# Patient Record
Sex: MALE | Race: WHITE | Employment: FULL TIME | URBAN - METROPOLITAN AREA
[De-identification: names, ages, dates, MRNs, and addresses within clinical notes are randomized per-mention and may not be internally consistent; named-entity substitution may affect disease eponyms.]

---

## 2021-12-22 ENCOUNTER — APPOINTMENT (OUTPATIENT)
Dept: GENERAL RADIOLOGY | Age: 26
DRG: 420 | End: 2021-12-22
Payer: MEDICAID

## 2021-12-22 ENCOUNTER — HOSPITAL ENCOUNTER (INPATIENT)
Age: 26
LOS: 1 days | Discharge: HOME | DRG: 420 | End: 2021-12-23
Attending: EMERGENCY MEDICINE | Admitting: INTERNAL MEDICINE
Payer: MEDICAID

## 2021-12-22 DIAGNOSIS — N39.0 URINARY TRACT INFECTION WITHOUT HEMATURIA, SITE UNSPECIFIED: Primary | ICD-10-CM

## 2021-12-22 DIAGNOSIS — R11.2 NON-INTRACTABLE VOMITING WITH NAUSEA, UNSPECIFIED VOMITING TYPE: ICD-10-CM

## 2021-12-22 DIAGNOSIS — E86.0 DEHYDRATION: ICD-10-CM

## 2021-12-22 PROBLEM — E10.10 DKA, TYPE 1, NOT AT GOAL (HCC): Status: ACTIVE | Noted: 2021-12-22

## 2021-12-22 LAB
A/G RATIO: 1.1 (ref 1.1–2.2)
ALBUMIN SERPL-MCNC: 4.5 G/DL (ref 3.4–5)
ALP BLD-CCNC: 115 U/L (ref 40–129)
ALT SERPL-CCNC: 17 U/L (ref 10–40)
ANION GAP SERPL CALCULATED.3IONS-SCNC: 11 MMOL/L (ref 3–16)
ANION GAP SERPL CALCULATED.3IONS-SCNC: 21 MMOL/L (ref 3–16)
AST SERPL-CCNC: 19 U/L (ref 15–37)
BACTERIA: ABNORMAL /HPF
BASE EXCESS VENOUS: -3.3 MMOL/L (ref -3–3)
BASOPHILS ABSOLUTE: 0 K/UL (ref 0–0.2)
BASOPHILS ABSOLUTE: 0.1 K/UL (ref 0–0.2)
BASOPHILS RELATIVE PERCENT: 0.3 %
BASOPHILS RELATIVE PERCENT: 0.4 %
BETA-HYDROXYBUTYRATE: 4.07 MMOL/L (ref 0–0.27)
BILIRUB SERPL-MCNC: 0.7 MG/DL (ref 0–1)
BILIRUBIN URINE: ABNORMAL
BLOOD, URINE: ABNORMAL
BUN BLDV-MCNC: 21 MG/DL (ref 7–20)
BUN BLDV-MCNC: 27 MG/DL (ref 7–20)
CALCIUM SERPL-MCNC: 10.5 MG/DL (ref 8.3–10.6)
CALCIUM SERPL-MCNC: 9.1 MG/DL (ref 8.3–10.6)
CARBOXYHEMOGLOBIN: 2.9 % (ref 0–1.5)
CHLORIDE BLD-SCNC: 101 MMOL/L (ref 99–110)
CHLORIDE BLD-SCNC: 95 MMOL/L (ref 99–110)
CLARITY: CLEAR
CO2: 18 MMOL/L (ref 21–32)
CO2: 23 MMOL/L (ref 21–32)
COLOR: YELLOW
CREAT SERPL-MCNC: 0.8 MG/DL (ref 0.9–1.3)
CREAT SERPL-MCNC: 1 MG/DL (ref 0.9–1.3)
EKG ATRIAL RATE: 97 BPM
EKG DIAGNOSIS: NORMAL
EKG P AXIS: 68 DEGREES
EKG P-R INTERVAL: 132 MS
EKG Q-T INTERVAL: 364 MS
EKG QRS DURATION: 100 MS
EKG QTC CALCULATION (BAZETT): 462 MS
EKG R AXIS: -9 DEGREES
EKG T AXIS: 71 DEGREES
EKG VENTRICULAR RATE: 97 BPM
EOSINOPHILS ABSOLUTE: 0 K/UL (ref 0–0.6)
EOSINOPHILS ABSOLUTE: 0 K/UL (ref 0–0.6)
EOSINOPHILS RELATIVE PERCENT: 0 %
EOSINOPHILS RELATIVE PERCENT: 0.2 %
EPITHELIAL CELLS, UA: ABNORMAL /HPF (ref 0–5)
ETHANOL: NORMAL MG/DL (ref 0–0.08)
GFR AFRICAN AMERICAN: >60
GFR AFRICAN AMERICAN: >60
GFR NON-AFRICAN AMERICAN: >60
GFR NON-AFRICAN AMERICAN: >60
GLUCOSE BLD-MCNC: 129 MG/DL (ref 70–99)
GLUCOSE BLD-MCNC: 137 MG/DL (ref 70–99)
GLUCOSE BLD-MCNC: 141 MG/DL (ref 70–99)
GLUCOSE BLD-MCNC: 145 MG/DL (ref 70–99)
GLUCOSE BLD-MCNC: 178 MG/DL (ref 70–99)
GLUCOSE BLD-MCNC: 183 MG/DL (ref 70–99)
GLUCOSE BLD-MCNC: 207 MG/DL (ref 70–99)
GLUCOSE BLD-MCNC: 214 MG/DL (ref 70–99)
GLUCOSE BLD-MCNC: 217 MG/DL (ref 70–99)
GLUCOSE URINE: >=1000 MG/DL
HCO3 VENOUS: 21.1 MMOL/L (ref 23–29)
HCT VFR BLD CALC: 40.7 % (ref 40.5–52.5)
HCT VFR BLD CALC: 48.6 % (ref 40.5–52.5)
HEMOGLOBIN: 14.1 G/DL (ref 13.5–17.5)
HEMOGLOBIN: 16.8 G/DL (ref 13.5–17.5)
HYALINE CASTS: ABNORMAL /LPF (ref 0–2)
KETONES, URINE: >=80 MG/DL
LACTIC ACID: 1 MMOL/L (ref 0.4–2)
LEUKOCYTE ESTERASE, URINE: NEGATIVE
LIPASE: 8 U/L (ref 13–60)
LYMPHOCYTES ABSOLUTE: 1.4 K/UL (ref 1–5.1)
LYMPHOCYTES ABSOLUTE: 2.2 K/UL (ref 1–5.1)
LYMPHOCYTES RELATIVE PERCENT: 10.3 %
LYMPHOCYTES RELATIVE PERCENT: 14.3 %
MAGNESIUM: 1.4 MG/DL (ref 1.8–2.4)
MAGNESIUM: 1.4 MG/DL (ref 1.8–2.4)
MCH RBC QN AUTO: 30.2 PG (ref 26–34)
MCH RBC QN AUTO: 30.2 PG (ref 26–34)
MCHC RBC AUTO-ENTMCNC: 34.6 G/DL (ref 31–36)
MCHC RBC AUTO-ENTMCNC: 34.6 G/DL (ref 31–36)
MCV RBC AUTO: 87.1 FL (ref 80–100)
MCV RBC AUTO: 87.3 FL (ref 80–100)
METHEMOGLOBIN VENOUS: 0.4 %
MICROSCOPIC EXAMINATION: YES
MONOCYTES ABSOLUTE: 0.7 K/UL (ref 0–1.3)
MONOCYTES ABSOLUTE: 1.1 K/UL (ref 0–1.3)
MONOCYTES RELATIVE PERCENT: 5.1 %
MONOCYTES RELATIVE PERCENT: 7 %
MUCUS: ABNORMAL /LPF
NEUTROPHILS ABSOLUTE: 11.7 K/UL (ref 1.7–7.7)
NEUTROPHILS ABSOLUTE: 11.8 K/UL (ref 1.7–7.7)
NEUTROPHILS RELATIVE PERCENT: 78.1 %
NEUTROPHILS RELATIVE PERCENT: 84.3 %
NITRITE, URINE: NEGATIVE
O2 SAT, VEN: 86 %
O2 THERAPY: ABNORMAL
PCO2, VEN: 36.5 MMHG (ref 40–50)
PDW BLD-RTO: 12.4 % (ref 12.4–15.4)
PDW BLD-RTO: 12.5 % (ref 12.4–15.4)
PERFORMED ON: ABNORMAL
PH UA: 5.5 (ref 5–8)
PH VENOUS: 7.38 (ref 7.35–7.45)
PHOSPHORUS: 2.9 MG/DL (ref 2.5–4.9)
PLATELET # BLD: 227 K/UL (ref 135–450)
PLATELET # BLD: 253 K/UL (ref 135–450)
PMV BLD AUTO: 8.9 FL (ref 5–10.5)
PMV BLD AUTO: 9.3 FL (ref 5–10.5)
PO2, VEN: 49.2 MMHG (ref 25–40)
POTASSIUM REFLEX MAGNESIUM: 4.2 MMOL/L (ref 3.5–5.1)
POTASSIUM SERPL-SCNC: 4.4 MMOL/L (ref 3.5–5.1)
PROCALCITONIN: 0.06 NG/ML (ref 0–0.15)
PROTEIN UA: ABNORMAL MG/DL
RAPID INFLUENZA  B AGN: NEGATIVE
RAPID INFLUENZA A AGN: NEGATIVE
RBC # BLD: 4.68 M/UL (ref 4.2–5.9)
RBC # BLD: 5.57 M/UL (ref 4.2–5.9)
RBC UA: ABNORMAL /HPF (ref 0–4)
SARS-COV-2, NAAT: NOT DETECTED
SODIUM BLD-SCNC: 134 MMOL/L (ref 136–145)
SODIUM BLD-SCNC: 135 MMOL/L (ref 136–145)
SPECIFIC GRAVITY UA: >=1.03 (ref 1–1.03)
TCO2 CALC VENOUS: 22 MMOL/L
TOTAL CK: 109 U/L (ref 39–308)
TOTAL PROTEIN: 8.5 G/DL (ref 6.4–8.2)
TROPONIN: <0.01 NG/ML
URINE REFLEX TO CULTURE: YES
URINE TYPE: ABNORMAL
UROBILINOGEN, URINE: 0.2 E.U./DL
WBC # BLD: 13.9 K/UL (ref 4–11)
WBC # BLD: 15.1 K/UL (ref 4–11)
WBC UA: ABNORMAL /HPF (ref 0–5)
YEAST: PRESENT /HPF

## 2021-12-22 PROCEDURE — 84100 ASSAY OF PHOSPHORUS: CPT

## 2021-12-22 PROCEDURE — 83690 ASSAY OF LIPASE: CPT

## 2021-12-22 PROCEDURE — 96376 TX/PRO/DX INJ SAME DRUG ADON: CPT

## 2021-12-22 PROCEDURE — 84484 ASSAY OF TROPONIN QUANT: CPT

## 2021-12-22 PROCEDURE — 6370000000 HC RX 637 (ALT 250 FOR IP): Performed by: INTERNAL MEDICINE

## 2021-12-22 PROCEDURE — G0378 HOSPITAL OBSERVATION PER HR: HCPCS

## 2021-12-22 PROCEDURE — 87040 BLOOD CULTURE FOR BACTERIA: CPT

## 2021-12-22 PROCEDURE — 87086 URINE CULTURE/COLONY COUNT: CPT

## 2021-12-22 PROCEDURE — 73130 X-RAY EXAM OF HAND: CPT

## 2021-12-22 PROCEDURE — 2580000003 HC RX 258: Performed by: EMERGENCY MEDICINE

## 2021-12-22 PROCEDURE — 83605 ASSAY OF LACTIC ACID: CPT

## 2021-12-22 PROCEDURE — 36415 COLL VENOUS BLD VENIPUNCTURE: CPT

## 2021-12-22 PROCEDURE — 1200000000 HC SEMI PRIVATE

## 2021-12-22 PROCEDURE — 82077 ASSAY SPEC XCP UR&BREATH IA: CPT

## 2021-12-22 PROCEDURE — 82550 ASSAY OF CK (CPK): CPT

## 2021-12-22 PROCEDURE — 80053 COMPREHEN METABOLIC PANEL: CPT

## 2021-12-22 PROCEDURE — 96368 THER/DIAG CONCURRENT INF: CPT

## 2021-12-22 PROCEDURE — 6360000002 HC RX W HCPCS: Performed by: INTERNAL MEDICINE

## 2021-12-22 PROCEDURE — 83735 ASSAY OF MAGNESIUM: CPT

## 2021-12-22 PROCEDURE — 96365 THER/PROPH/DIAG IV INF INIT: CPT

## 2021-12-22 PROCEDURE — 93010 ELECTROCARDIOGRAM REPORT: CPT | Performed by: INTERNAL MEDICINE

## 2021-12-22 PROCEDURE — 99284 EMERGENCY DEPT VISIT MOD MDM: CPT

## 2021-12-22 PROCEDURE — 73090 X-RAY EXAM OF FOREARM: CPT

## 2021-12-22 PROCEDURE — 6360000002 HC RX W HCPCS

## 2021-12-22 PROCEDURE — 82010 KETONE BODYS QUAN: CPT

## 2021-12-22 PROCEDURE — 96375 TX/PRO/DX INJ NEW DRUG ADDON: CPT

## 2021-12-22 PROCEDURE — 84145 PROCALCITONIN (PCT): CPT

## 2021-12-22 PROCEDURE — C9113 INJ PANTOPRAZOLE SODIUM, VIA: HCPCS | Performed by: EMERGENCY MEDICINE

## 2021-12-22 PROCEDURE — 71046 X-RAY EXAM CHEST 2 VIEWS: CPT

## 2021-12-22 PROCEDURE — 96366 THER/PROPH/DIAG IV INF ADDON: CPT

## 2021-12-22 PROCEDURE — 96361 HYDRATE IV INFUSION ADD-ON: CPT

## 2021-12-22 PROCEDURE — 87804 INFLUENZA ASSAY W/OPTIC: CPT

## 2021-12-22 PROCEDURE — 93005 ELECTROCARDIOGRAM TRACING: CPT | Performed by: EMERGENCY MEDICINE

## 2021-12-22 PROCEDURE — 6360000002 HC RX W HCPCS: Performed by: EMERGENCY MEDICINE

## 2021-12-22 PROCEDURE — 82803 BLOOD GASES ANY COMBINATION: CPT

## 2021-12-22 PROCEDURE — 85025 COMPLETE CBC W/AUTO DIFF WBC: CPT

## 2021-12-22 PROCEDURE — 81001 URINALYSIS AUTO W/SCOPE: CPT

## 2021-12-22 PROCEDURE — 87635 SARS-COV-2 COVID-19 AMP PRB: CPT

## 2021-12-22 RX ORDER — DEXTROSE MONOHYDRATE 25 G/50ML
12.5 INJECTION, SOLUTION INTRAVENOUS PRN
Status: DISCONTINUED | OUTPATIENT
Start: 2021-12-22 | End: 2021-12-23 | Stop reason: HOSPADM

## 2021-12-22 RX ORDER — 0.9 % SODIUM CHLORIDE 0.9 %
30 INTRAVENOUS SOLUTION INTRAVENOUS ONCE
Status: COMPLETED | OUTPATIENT
Start: 2021-12-22 | End: 2021-12-22

## 2021-12-22 RX ORDER — ONDANSETRON 2 MG/ML
4 INJECTION INTRAMUSCULAR; INTRAVENOUS ONCE
Status: COMPLETED | OUTPATIENT
Start: 2021-12-22 | End: 2021-12-22

## 2021-12-22 RX ORDER — ONDANSETRON 2 MG/ML
INJECTION INTRAMUSCULAR; INTRAVENOUS
Status: COMPLETED
Start: 2021-12-22 | End: 2021-12-22

## 2021-12-22 RX ORDER — 0.9 % SODIUM CHLORIDE 0.9 %
1000 INTRAVENOUS SOLUTION INTRAVENOUS ONCE
Status: COMPLETED | OUTPATIENT
Start: 2021-12-22 | End: 2021-12-22

## 2021-12-22 RX ORDER — DEXTROSE MONOHYDRATE 50 MG/ML
100 INJECTION, SOLUTION INTRAVENOUS PRN
Status: DISCONTINUED | OUTPATIENT
Start: 2021-12-22 | End: 2021-12-23 | Stop reason: HOSPADM

## 2021-12-22 RX ORDER — SODIUM CHLORIDE 9 MG/ML
INJECTION, SOLUTION INTRAVENOUS CONTINUOUS
Status: DISCONTINUED | OUTPATIENT
Start: 2021-12-22 | End: 2021-12-23 | Stop reason: HOSPADM

## 2021-12-22 RX ORDER — INSULIN GLARGINE 100 [IU]/ML
20 INJECTION, SOLUTION SUBCUTANEOUS NIGHTLY
Status: DISCONTINUED | OUTPATIENT
Start: 2021-12-22 | End: 2021-12-23 | Stop reason: HOSPADM

## 2021-12-22 RX ORDER — NICOTINE POLACRILEX 4 MG
15 LOZENGE BUCCAL PRN
Status: DISCONTINUED | OUTPATIENT
Start: 2021-12-22 | End: 2021-12-23 | Stop reason: HOSPADM

## 2021-12-22 RX ORDER — ACETAMINOPHEN 650 MG/1
650 SUPPOSITORY RECTAL EVERY 6 HOURS PRN
Status: DISCONTINUED | OUTPATIENT
Start: 2021-12-22 | End: 2021-12-23 | Stop reason: HOSPADM

## 2021-12-22 RX ORDER — DEXTROSE AND SODIUM CHLORIDE 5; .45 G/100ML; G/100ML
INJECTION, SOLUTION INTRAVENOUS CONTINUOUS PRN
Status: DISCONTINUED | OUTPATIENT
Start: 2021-12-22 | End: 2021-12-23 | Stop reason: HOSPADM

## 2021-12-22 RX ORDER — POTASSIUM CHLORIDE 7.45 MG/ML
10 INJECTION INTRAVENOUS PRN
Status: DISCONTINUED | OUTPATIENT
Start: 2021-12-22 | End: 2021-12-23 | Stop reason: HOSPADM

## 2021-12-22 RX ORDER — INSULIN GLARGINE 100 [IU]/ML
INJECTION, SOLUTION SUBCUTANEOUS DAILY
COMMUNITY

## 2021-12-22 RX ORDER — ONDANSETRON 2 MG/ML
4 INJECTION INTRAMUSCULAR; INTRAVENOUS EVERY 6 HOURS PRN
Status: DISCONTINUED | OUTPATIENT
Start: 2021-12-22 | End: 2021-12-23 | Stop reason: HOSPADM

## 2021-12-22 RX ORDER — ACETAMINOPHEN 325 MG/1
650 TABLET ORAL EVERY 6 HOURS PRN
Status: DISCONTINUED | OUTPATIENT
Start: 2021-12-22 | End: 2021-12-23 | Stop reason: HOSPADM

## 2021-12-22 RX ORDER — POLYETHYLENE GLYCOL 3350 17 G/17G
17 POWDER, FOR SOLUTION ORAL DAILY PRN
Status: DISCONTINUED | OUTPATIENT
Start: 2021-12-22 | End: 2021-12-23 | Stop reason: HOSPADM

## 2021-12-22 RX ORDER — MAGNESIUM SULFATE 1 G/100ML
1000 INJECTION INTRAVENOUS PRN
Status: DISCONTINUED | OUTPATIENT
Start: 2021-12-22 | End: 2021-12-23 | Stop reason: HOSPADM

## 2021-12-22 RX ADMIN — ONDANSETRON 4 MG: 2 INJECTION INTRAMUSCULAR; INTRAVENOUS at 13:57

## 2021-12-22 RX ADMIN — SODIUM CHLORIDE 1000 ML: 9 INJECTION, SOLUTION INTRAVENOUS at 03:21

## 2021-12-22 RX ADMIN — CEFTRIAXONE SODIUM 1000 MG: 1 INJECTION, POWDER, FOR SOLUTION INTRAMUSCULAR; INTRAVENOUS at 06:47

## 2021-12-22 RX ADMIN — SODIUM CHLORIDE 80 MG: 9 INJECTION, SOLUTION INTRAVENOUS at 04:08

## 2021-12-22 RX ADMIN — SALINE NASAL SPRAY 1 SPRAY: 1.5 SOLUTION NASAL at 13:57

## 2021-12-22 RX ADMIN — INSULIN GLARGINE 20 UNITS: 100 INJECTION, SOLUTION SUBCUTANEOUS at 21:30

## 2021-12-22 RX ADMIN — SODIUM CHLORIDE 2382 ML: 9 INJECTION, SOLUTION INTRAVENOUS at 06:43

## 2021-12-22 RX ADMIN — INSULIN HUMAN 17 UNITS: 100 INJECTION, SUSPENSION SUBCUTANEOUS at 06:52

## 2021-12-22 RX ADMIN — ONDANSETRON 4 MG: 2 INJECTION INTRAMUSCULAR; INTRAVENOUS at 18:38

## 2021-12-22 RX ADMIN — SODIUM CHLORIDE 8 MG/HR: 9 INJECTION, SOLUTION INTRAVENOUS at 04:37

## 2021-12-22 RX ADMIN — ONDANSETRON 4 MG: 2 INJECTION INTRAMUSCULAR; INTRAVENOUS at 03:21

## 2021-12-22 ASSESSMENT — PAIN SCALES - GENERAL
PAINLEVEL_OUTOF10: 0
PAINLEVEL_OUTOF10: 8
PAINLEVEL_OUTOF10: 3
PAINLEVEL_OUTOF10: 0

## 2021-12-22 ASSESSMENT — PAIN DESCRIPTION - PAIN TYPE: TYPE: ACUTE PAIN

## 2021-12-22 ASSESSMENT — PAIN DESCRIPTION - DESCRIPTORS: DESCRIPTORS: HEADACHE

## 2021-12-22 ASSESSMENT — PAIN DESCRIPTION - LOCATION: LOCATION: HEAD

## 2021-12-22 NOTE — ED PROVIDER NOTES
[facility]  ED  EMERGENCY DEPARTMENT ENCOUNTER      Pt Name: Tita Tolbert  MRN: [id number]  [place] 1995  Date of evaluation: 12/22/2021  Provider: Dougie Dale MD    [address] [place] [address]       Chief Complaint   Patient presents with    Nausea     pt to ED with c/o N/V for a few days at home. type 1 diabetic.  for EMS. HISTORY OF PRESENT ILLNESS   (Location/Symptom, Timing/Onset,Context/Setting, Quality, Duration, Modifying Factors, Severity)  Note limiting factors. Tita Tolbert is a 23 y.o. male who presents to the emergency department for feels like he is dying. He is type 1 diabetic and has been having nausea and vomiting. Generalized weakness. Chest pain. Blood in his vomit. Patient is very uncomfortable and is having a hard time providing with history. Nursing notes were reviewed. REVIEW OF SYSTEMS    (2-9 systems for level 4, 10 or more for level 5)     Review of Systems  Difficult to obtain due to poor cooperation    PAST MEDICAL HISTORY     Past Medical History:   Diagnosis Date    Diabetes mellitus ([address]     Thyroid disease          SURGICALHISTORY     History reviewed. No pertinent surgical history. CURRENT MEDICATIONS       Previous Medications    No medications on file       ALLERGIES     Sulfa antibiotics    FAMILY HISTORY     History reviewed. No pertinent family history.        SOCIAL HISTORY       Social History     Socioeconomic History    Marital status: Single     Spouse name: None    Number of children: None    Years of education: None    Highest education level: None   Occupational History    None   Tobacco Use    Smoking status: Never Smoker    Smokeless tobacco: Never Used   Substance and Sexual Activity    Alcohol use: Not Currently    Drug use: Yes     Types: Marijuana Maurita Handsome)    Sexual activity: None   Other Topics Concern    None   Social History Narrative    None     Social Determinants of Health     Financial Resource Strain:  Difficulty of Paying Living Expenses: Not on file   Food Insecurity:     Worried About Running Out of Food in the Last Year: Not on file    Ran Out of Food in the Last Year: Not on file   Transportation Needs:     Lack of Transportation (Medical): Not on file    Lack of Transportation (Non-Medical): Not on file   Physical Activity:     Days of Exercise per Week: Not on file    Minutes of Exercise per Session: Not on file   Stress:     Feeling of Stress : Not on file   Social Connections:     Frequency of Communication with Friends and Family: Not on file    Frequency of Social Gatherings with Friends and Family: Not on file    Attends Episcopal Services: Not on file    Active Member of [email or web address] Group or Organizations: Not on file    Attends Club or Organization Meetings: Not on file    Marital Status: Not on file   Intimate Partner Violence:     Fear of Current or Ex-Partner: Not on file    Emotionally Abused: Not on file    Physically Abused: Not on file    Sexually Abused: Not on file   Housing Stability:     Unable to Pay for Housing in the Last Year: Not on file    Number of [place] in the Last Year: Not on file    Unstable Housing in the Last Year: Not on file       SCREENINGS             PHYSICAL EXAM    (up to 7 for level 4, 8 or more for level 5)     ED Triage Vitals [12/22/21 0300]   BP Temp Temp Source Pulse Resp SpO2 Height Weight   (!) 132/95 97.6 °F (36.4 °C) Oral 96 16 97 % 6' 1\" (1.854 m) 175 lb (79.4 kg)       Physical Exam  Vitals and nursing note reviewed. Constitutional:       Appearance: Normal appearance. He is well-developed. He is ill-appearing. HENT:      Head: Normocephalic and atraumatic. Right Ear: External ear normal.      Left Ear: External ear normal.      Nose: Nose normal.      Mouth/Throat:      Comments: Edentulous  Eyes:      General: No scleral icterus. Right eye: No discharge. Left eye: No discharge.       Conjunctiva/sclera: TO MG FOR LOW K - Abnormal; Notable for the following components:    Sodium 134 (*)     Chloride 95 (*)     CO2 18 (*)     Anion Gap 21 (*)     Glucose 207 (*)     BUN 27 (*)     Total Protein 8.5 (*)     All other components within normal limits    Narrative:     Performed at:  [address], [facility] [employer]   Phone [phone]   BLOOD GAS, VENOUS - Abnormal; Notable for the following components:    pCO2, Vladimir 36.5 (*)     pO2, Vladimir 49.2 (*)     HCO3, Venous 21.1 (*)     Base Excess, Vladimir -3.3 (*)     Carboxyhemoglobin 2.9 (*)     All other components within normal limits    Narrative:     Performed at:  [address], [facility] [employer]   Phone [phone]   URINE RT REFLEX TO CULTURE - Abnormal; Notable for the following components:    Glucose, Ur >=1000 (*)     Bilirubin Urine SMALL (*)     Ketones, Urine >=80 (*)     Blood, Urine TRACE-INTACT (*)     Protein, UA TRACE (*)     All other components within normal limits    Narrative:     Performed at:  [address], [facility] [employer]   Phone [phone]   LIPASE - Abnormal; Notable for the following components:    Lipase 8.0 (*)     All other components within normal limits    Narrative:     Performed at:  [address], [facility] [employer]   Phone [phone]   MICROSCOPIC URINALYSIS - Abnormal; Notable for the following components:    Mucus, UA 1+ (*)     WBC, UA 10-20 (*)     Bacteria, UA Rare (*)     Yeast, UA Present (*)     All other components within normal limits    Narrative:     Performed at:  [address], [facility] [employer]   Phone [phone]   POCT GLUCOSE - Abnormal; Notable for the following components:    POC Glucose 183 (*)     All other components within normal limits    Narrative: to the hospitalist on duty who has agreed to admit this patient to his service. SEP-1 CORE MEASURE DATA      Sepsis Criteria   Severe Sepsis Criteria   Septic Shock Criteria     Must be confirmed or suspected to move forward with diagnosis of sepsis. Must meet 2:    [] Temperature > 100.9 F (38.3 C)        or < 96.8 F (36 C)  [x] HR > 90  [] RR > 20  [x] WBC > 12 or < 4 or 10% bands    AND:    [x] Infection Confirmed or        Suspected. OR:    [] Exclude from SEP-1 because:    [] No infection present or suspected   [] May have sepsis, but does not meet criteria for severe sepsis or septic shock  [] Alternative explanation for abnormal labs and/or vitals (see MDM)  [] Viral etiology found or highly suspected (including COVID-19) without concomitant bacterial infection   Must meet 1:    [] Lactate > 2       or   [] Signs of Organ Dysfunction:    - SBP < 90 or MAP < 65  - Altered mental status  - Creatinine > 2 or increased from      baseline  - Urine Output < 0.5 ml/kg/hr  - Bilirubin > 2  - INR > 1.5 (not anticoagulated)  - Platelets < 311,105  - Acute Respiratory Failure as     evidenced by new need for NIPPV     or mechanical ventilation        [] No criteria met for Severe Sepsis. Must meet 1:    [] Lactate > 4        or   [] SBP < 90 or MAP < 65 for at        least two readings in the first        hour after fluid bolus        administration      [] Vasopressors initiated (if hypotension persists after fluid resuscitation)                [] No criteria met for Septic Shock.    Patient Vitals for the past 6 hrs:   BP Temp Pulse Resp SpO2 Height Weight Weight Method Percent Weight Change   12/22/21 0300 (!) 132/95 97.6 °F (36.4 °C) 96 16 97 % 6' 1\" (1.854 m) 175 lb (79.4 kg) Stated 0      Recent Labs     12/22/21  0322   WBC 15.1*   CREATININE 1.0   BILITOT 0.7            Sepsis Time Identified: [id number]    Fluid Resuscitation Rational: at least 30mL/kg based on entered actual weight at time of triage    Infection Source: Urinary System    Repeat lactate level: pending    Reassessment Exam:   pending, to be completed by inpatient team    CRITICAL CARE TIME   Total Critical Care time was 45 minutes, excluding separately reportable procedures. There was a high probability of clinically significant/life threatening deterioration in the patient's condition which required my urgent intervention. CONSULTS:  IP CONSULT TO HOSPITALIST    PROCEDURES:       Procedures    FINAL IMPRESSION      1. Urinary tract infection without hematuria, site unspecified    2. Dehydration    3. Non-intractable vomiting with nausea, unspecified vomiting type          DISPOSITION/PLAN   DISPOSITION Admitted 12/22/2021 06:00:46 AM      PATIENT REFERREDTO:  No follow-up provider specified.     DISCHARGEMEDICATIONS:  New Prescriptions    No medications on file          (Please note that portions of this note were completed with a voice recognition program.  Efforts were made to edit the dictations but occasionally words are mis-transcribed.)    Vin Maldonado MD (electronically signed)  Attending Emergency Physician          Vin Maldonado MD  12/22/21 5181

## 2021-12-22 NOTE — Clinical Note
Patient Class: Inpatient [101]   REQUIRED: Diagnosis: DKA, type 1, not at goal [facility]) [[id number]]   Estimated Length of Stay: Estimated stay of more than 2 midnights

## 2021-12-22 NOTE — ED NOTES
RN at bedside with tech to get a urine sample. Patient continuously rolling eyes at staff and being argumentative. Patient stating that staff are threatening him and he does not want to give a sample RN explained to patient that this is what was needed to help him and that the doctor ordered a urine sample but he was allowed to refuse. Urinal given to provide sample.       Shoaib Ventura RN  12/22/21 8268

## 2021-12-22 NOTE — H&P
Hospital Medicine History & Physical      PCP: Tyrone Briceño    Date of Admission: 12/22/2021    Date of Service: Pt seen/examined on 12/22/21 and Admitted to Inpatient with expected LOS greater than two midnights due to medical therapy. Chief Complaint   Patient presents with    Nausea     pt to ED with c/o N/V for a few days at home. type 1 diabetic.  for EMS. History Of Present Illness:  23 y.o. male With Type 1 IDDM presented to [facility] ED with C/o Nausea and Vomiting for past of Couple Of Days. Patient states that he was diagnosed when he was 16 yrs Old and Has intermittently admitted to hospitals for DKA (none this year). Patient states that he is homeless and lives with Sister . He smokes THC (last time done yesterday). He reports \"feeling Yucky and felt like Heartburn of DKA \" . He had some nausea with it and some Vomiting with Blood in it . Denies any fever, but has some chills . Denies being Vaccinated for Covid. Denies any Cough, Runny nose , Hemoptysis, SOB , chest pain, abdominal pain . He hasn't taken his Insulin for the last couple of Days as he was unable to  Keep anything down , Hence came to ED for further E/M .     ED course : Patient hemodynamically stable upon arrival to ED . Labs showed mild DKA with AG 21 and BG  217 . He received 20 u Lantus along with IVF bolus . He was also started on IV protonix gtt . Hospitalist consulted for admission for further E/M     Upon evaluation by me in ED , patient reports much better since been in ED after Rx . His recent BG is 145 and repeat BMP with closed GAP . He reports that he takes NPH 10 units BID along with Novolin 10:1 scale . He is tolerating clears well while in ED and RN reports no N/V since in ED . Will downgrade from ICU to Med/Surg with Tele . Past Medical History:      Diagnosis Date    Diabetes mellitus ([address]     Thyroid disease        Past Surgical History:    History reviewed.  No pertinent surgical history. Medications Prior to Admission:    Prior to Admission medications    Not on File       Allergies:  Sulfa antibiotics    Social History:    The patient currently lives with his sister and is independent of IADLS     TOBACCO:   reports that he has never smoked. He has never used smokeless tobacco.  ETOH:   reports previous alcohol use. Family History:     Reviewed in detail and negative for DM, CAD, Cancer, CVA. Positive as follows:    History reviewed. No pertinent family history. REVIEW OF SYSTEMS:   Pertinent positives as noted in the HPI. All other systems reviewed and negative. PHYSICAL EXAM PERFORMED:  /77   Pulse 90   Temp 97.6 °F (36.4 °C) (Oral)   Resp 16   Ht 6' 1\" (1.854 m)   Wt 175 lb (79.4 kg)   SpO2 97%   BMI 23.09 kg/m²     General appearance:  No apparent distress, appears stated age and cooperative. HEENT:  Normal cephalic, atraumatic without obvious deformity. Pupils equal, round, and reactive to light. Extra ocular muscles intact. Conjunctivae/corneas clear. Edentulous   Neck: Supple, with full range of motion. No jugular venous distention. Trachea midline. Respiratory:  Normal respiratory effort. Clear to auscultation, bilaterally without Rales/Wheezes/Rhonchi. Cardiovascular:  Regular rate and rhythm with normal S1/S2 without murmurs, rubs or gallops. Abdomen: Soft, non-tender, non-distended with normal bowel sounds. Musculoskeletal:  No clubbing, cyanosis or edema bilaterally. Full range of motion without deformity. Skin: Skin color, texture, turgor normal.  No rashes or lesions. Neurologic:  Neurovascularly intact without any focal sensory/motor deficits.  Cranial nerves: II-XII intact, grossly non-focal.  Psychiatric:  Alert and oriented, thought content appropriate, normal insight  Capillary Refill: Brisk,< 3 seconds   Peripheral Pulses: +2 palpable, equal bilaterally       Labs:   Recent Labs     12/22/21  0322 12/22/21  1030   WBC 15.1* 13.9*   HGB 16.8 14.1   HCT 48.6 40.7    227     Recent Labs     12/22/21  0322 12/22/21  1030   * 135*   K 4.2 4.4   CL 95* 101   CO2 18* 23   BUN 27* 21*   CREATININE 1.0 0.8*   CALCIUM 10.5 9.1   PHOS  --  2.9     Recent Labs     12/22/21  0322   AST 19   ALT 17   BILITOT 0.7   ALKPHOS 115     No results for input(s): INR in the last 72 hours. Recent Labs     12/22/21  0322 12/22/21  0745   CKTOTAL 109  --    TROPONINI  --  <0.01       Urinalysis:    Lab Results   Component Value Date    NITRU Negative 12/22/2021    WBCUA 10-20 12/22/2021    BACTERIA Rare 12/22/2021    RBCUA 3-4 12/22/2021    BLOODU TRACE-INTACT 12/22/2021    SPECGRAV >=1.030 12/22/2021    GLUCOSEU >=1000 12/22/2021       EKG:  I have reviewed the EKG with the following interpretation: NSR , Normal Axis , Normal Intervals , No significant ST/T changes     Radiology:    I have reviewed the Imaging  with the following interpretation:   XR HAND RIGHT (MIN 3 VIEWS)   Preliminary Result   1. Visualization of tiny ossific density along dorsal aspect of   carpometacarpal region. Finding may represent subtle avulsion injury, age   indeterminate. 2. Mild contour irregularity of the distal 5th metacarpal.  Finding raises   possibility of changes of prior trauma. Clinical correlation would be   helpful. XR RADIUS ULNA RIGHT (2 VIEWS)   Preliminary Result   1. Visualization of tiny ossific density along the dorsal aspect of the base   of the metacarpals near the carpometacarpal region. Finding could represent   a small avulsion injury, age indeterminate. 2. Otherwise, no evidence of acute fracture or dislocation. XR CHEST (2 VW)   Final Result   Clear lungs. Active Hospital Problems    Diagnosis Date Noted    DKA, type 1, not at goal [facility]) [E10.10] 12/22/2021     ASSESSMENT/PLAN:  1. DKA Type 1 secondary to Non compliance from his Insulin regimen   - BG - 217 ; AG 21 .  Received Lantus 20 units in ED with improvement - Continue NPH 10 bid (Home dose ) ; IVF , Monitor BMP and anticipate improvement     2. THC abuse - Counseled about abstinence . Will follow     DVT Prophylaxis: Lovenox   Diet: ADULT DIET; Regular; 4 carb choices (60 gm/meal)  Code Status: No Order    PT/OT Eval Status: Ambulatory     Dispo - Likely 1-2 days pending Clinical improvement      Mariajose Robb MD    The note was completed using Dragon -speech recognition software & EMR  . Every effort was made to ensure accuracy; however, inadvertent computerized transcription errors may be present. Thank you Teresa Patrick for the opportunity to be involved in this patient's care. If you have any questions or concerns please feel free to contact me at [phone].

## 2021-12-23 VITALS
HEART RATE: 85 BPM | TEMPERATURE: 98.4 F | DIASTOLIC BLOOD PRESSURE: 79 MMHG | HEIGHT: 73 IN | OXYGEN SATURATION: 97 % | SYSTOLIC BLOOD PRESSURE: 120 MMHG | BODY MASS INDEX: 23.19 KG/M2 | WEIGHT: 175 LBS | RESPIRATION RATE: 16 BRPM

## 2021-12-23 PROBLEM — S62.347A CLOSED NONDISPLACED FRACTURE OF BASE OF FIFTH METACARPAL BONE OF LEFT HAND: Status: ACTIVE | Noted: 2021-12-23

## 2021-12-23 LAB
ANION GAP SERPL CALCULATED.3IONS-SCNC: 11 MMOL/L (ref 3–16)
BASOPHILS ABSOLUTE: 0.1 K/UL (ref 0–0.2)
BASOPHILS RELATIVE PERCENT: 0.5 %
BUN BLDV-MCNC: 12 MG/DL (ref 7–20)
CALCIUM SERPL-MCNC: 9.3 MG/DL (ref 8.3–10.6)
CHLORIDE BLD-SCNC: 102 MMOL/L (ref 99–110)
CO2: 25 MMOL/L (ref 21–32)
CREAT SERPL-MCNC: 0.8 MG/DL (ref 0.9–1.3)
EOSINOPHILS ABSOLUTE: 0.1 K/UL (ref 0–0.6)
EOSINOPHILS RELATIVE PERCENT: 0.7 %
GFR AFRICAN AMERICAN: >60
GFR NON-AFRICAN AMERICAN: >60
GLUCOSE BLD-MCNC: 158 MG/DL (ref 70–99)
GLUCOSE BLD-MCNC: 171 MG/DL (ref 70–99)
GLUCOSE BLD-MCNC: 175 MG/DL (ref 70–99)
GLUCOSE BLD-MCNC: 178 MG/DL (ref 70–99)
HCT VFR BLD CALC: 42.9 % (ref 40.5–52.5)
HEMOGLOBIN: 14.8 G/DL (ref 13.5–17.5)
LYMPHOCYTES ABSOLUTE: 3.1 K/UL (ref 1–5.1)
LYMPHOCYTES RELATIVE PERCENT: 29.1 %
MCH RBC QN AUTO: 30.4 PG (ref 26–34)
MCHC RBC AUTO-ENTMCNC: 34.5 G/DL (ref 31–36)
MCV RBC AUTO: 88.3 FL (ref 80–100)
MONOCYTES ABSOLUTE: 0.9 K/UL (ref 0–1.3)
MONOCYTES RELATIVE PERCENT: 8.9 %
NEUTROPHILS ABSOLUTE: 6.5 K/UL (ref 1.7–7.7)
NEUTROPHILS RELATIVE PERCENT: 60.8 %
ORGANISM: ABNORMAL
PDW BLD-RTO: 12.5 % (ref 12.4–15.4)
PERFORMED ON: ABNORMAL
PLATELET # BLD: 200 K/UL (ref 135–450)
PMV BLD AUTO: 8.5 FL (ref 5–10.5)
POTASSIUM REFLEX MAGNESIUM: 3.6 MMOL/L (ref 3.5–5.1)
RBC # BLD: 4.86 M/UL (ref 4.2–5.9)
SODIUM BLD-SCNC: 138 MMOL/L (ref 136–145)
URINE CULTURE, ROUTINE: ABNORMAL
WBC # BLD: 10.6 K/UL (ref 4–11)

## 2021-12-23 PROCEDURE — 6370000000 HC RX 637 (ALT 250 FOR IP): Performed by: INTERNAL MEDICINE

## 2021-12-23 PROCEDURE — 80048 BASIC METABOLIC PNL TOTAL CA: CPT

## 2021-12-23 PROCEDURE — 83036 HEMOGLOBIN GLYCOSYLATED A1C: CPT

## 2021-12-23 PROCEDURE — 36415 COLL VENOUS BLD VENIPUNCTURE: CPT

## 2021-12-23 PROCEDURE — 85025 COMPLETE CBC W/AUTO DIFF WBC: CPT

## 2021-12-23 PROCEDURE — 2580000003 HC RX 258: Performed by: INTERNAL MEDICINE

## 2021-12-23 PROCEDURE — G0378 HOSPITAL OBSERVATION PER HR: HCPCS

## 2021-12-23 RX ADMIN — INSULIN LISPRO 2 UNITS: 100 INJECTION, SOLUTION INTRAVENOUS; SUBCUTANEOUS at 12:37

## 2021-12-23 RX ADMIN — SODIUM CHLORIDE: 9 INJECTION, SOLUTION INTRAVENOUS at 11:03

## 2021-12-23 RX ADMIN — INSULIN LISPRO 2 UNITS: 100 INJECTION, SOLUTION INTRAVENOUS; SUBCUTANEOUS at 08:50

## 2021-12-23 ASSESSMENT — PAIN SCALES - GENERAL
PAINLEVEL_OUTOF10: 0
PAINLEVEL_OUTOF10: 3

## 2021-12-23 NOTE — DISCHARGE INSTR - DIET

## 2021-12-23 NOTE — DISCHARGE SUMMARY
Discharge Summary    Patient ID: Zackery Navsa      Patient's PCP: No primary care provider on file. Admit Date: 12/22/2021     Discharge Date: 12/23/2021 12/23/2021    Admitting Physician: Aung Ibrahim DO     Discharge Physician: Candida Wren MD     Discharge Diagnoses: Active Hospital Problems    Diagnosis     DKA, type 1, not at goal [facility]) [E10.10]        The patient was seen and examined on day of discharge and this discharge summary is in conjunction with any daily progress note from day of discharge. Hospital Course: Zackery Navas is a 23 y.o. male With Type 1 IDDM presented to [facility] ED with C/o nausea and vomiting with occasional blood for past of Couple Of Days. Diagnosed at 13 yo, intermittently admitted to hospitals for DKA (last time Dec 2020). Patient states that he is homeless and lives with sister. He smokes THC (last time day before admission). He reports \"feeling yucky and felt like heartburn of DKA. \" He hasn't taken his insulin for the last couple of days as he was unable to keep anything down. Reports that he takes NPH 10 units BID along with Novolin 10:1 scale. In ED, labs showed mild DKA with AG 21 and BG  217. He received 20 u Lantus and a IVF bolus, and IV pantoprazole. Patient did not eat dinner last night, but took Ensure and received home dose Lantus. Was able to consume most of his breakfast this morning and overall feeling much better. Continues to have some epigastric pain that started after his noted hematemesis, but it is improving. Patient's AG closed yesterday when moved to floor and continues to be stable. POC glucose at goal, ~140 prior to meals. Patient feels comfortable to go home and spoke with Nidia Rees, Social Work, about getting a new PCP and changing pharmacies for convenience. Patient also admits to having depressive symptoms now and also in past and has seen a therapist before.  He is agreeable to working with a therapist in the future.    Avinash Razor abuse  - Counseled about abstinence  - Pt knowledgeable of potential for cyclical vomiting    Encouraged patient to follow-up with orthopedist due to right hand bone fracture. Physical Exam Performed:   /79   Pulse 85   Temp 98.4 °F (36.9 °C) (Oral)   Resp 16   Ht 6' 1\" (1.854 m)   Wt 175 lb (79.4 kg)   SpO2 97%   BMI 23.09 kg/m²       General appearance:  No apparent distress, appears stated age and cooperative. HEENT:  Normal cephalic, atraumatic without obvious deformity. Pupils equal, round, and reactive to light. Extra ocular muscles intact. Conjunctivae/corneas clear. Neck: Supple, with full range of motion. No jugular venous distention. Trachea midline. Respiratory:  Normal respiratory effort. Clear to auscultation, bilaterally without Rales/Wheezes/Rhonchi. Cardiovascular:  Regular rate and rhythm with normal S1/S2 without murmurs, rubs or gallops. Abdomen: Soft, non-tender, non-distended  Musculoskeletal:  No clubbing, cyanosis or edema bilaterally. Full range of motion without deformity. Skin: Skin color, texture, turgor normal.  No rashes or lesions. Neurologic:  Neurovascularly intact without any focal sensory/motor deficits. Cranial nerves: II-XII intact, grossly non-focal.  Psychiatric:  Alert and oriented, thought content appropriate, normal insight  Capillary Refill: Brisk,< 3 seconds   Peripheral Pulses: +2 palpable, equal bilaterally       Labs:  For convenience and continuity at follow-up the following most recent labs are provided:      CBC:    Lab Results   Component Value Date    WBC 10.6 12/23/2021    HGB 14.8 12/23/2021    HCT 42.9 12/23/2021     12/23/2021       Renal:    Lab Results   Component Value Date     12/23/2021    K 3.6 12/23/2021     12/23/2021    CO2 25 12/23/2021    BUN 12 12/23/2021    CREATININE 0.8 12/23/2021    CALCIUM 9.3 12/23/2021    PHOS 2.9 12/22/2021         Significant Diagnostic Studies    Radiology:   XR HAND RIGHT (MIN 3 VIEWS)   Preliminary Result   1. Visualization of tiny ossific density along dorsal aspect of   carpometacarpal region. Finding may represent subtle avulsion injury, age   indeterminate. 2. Mild contour irregularity of the distal 5th metacarpal.  Finding raises   possibility of changes of prior trauma. Clinical correlation would be   helpful. XR RADIUS ULNA RIGHT (2 VIEWS)   Preliminary Result   1. Visualization of tiny ossific density along the dorsal aspect of the base   of the metacarpals near the carpometacarpal region. Finding could represent   a small avulsion injury, age indeterminate. 2. Otherwise, no evidence of acute fracture or dislocation. XR CHEST (2 VW)   Final Result   Clear lungs. Consults:   None    Disposition:  Home     Condition at Discharge: Stable    Discharge Instructions/Follow-up:    Follow-up hospital stay by establishing care with PCP in this area. Follow-up with orthopedic specialist for bone fracture of right hand. Code Status:  Full Code     Activity: activity as tolerated    Diet: regular diet      Discharge Medications:     Discharge Medication List as of 12/23/2021  3:37 PM           Details   insulin glargine (LANTUS) 100 UNIT/ML injection vial Inject into the skin daily 34 unitsHistorical Med             Time Spent on discharge is more than 30 minutes in the examination, evaluation, counseling and review of medications and discharge plan. Signed:    Cristian Rebolledo MD   PGY-1  12/23/2021     Thank you No primary care provider on file. for the opportunity to be involved in this patient's care. If you have any questions or concerns please feel free to contact me at [phone].

## 2021-12-23 NOTE — CARE COORDINATION
12/23: Patient with DC order. Per prior conversation today, patient states he was staying with his sister and emergently brought here by squad. States he normally is from the [facility] and does not have stable housing. States his sister is planning to help him secure an apartment with stimulus funds that have not been used yet. Patient is not receptive to community resources and housing information/homeless shelters. Does state he is able to obtain meds at [facility] and denies need for help; has insurance through [facility]. Does not have PCP; explained [facility] and patient receptive. Information given to patient regarding scheduling appointment at clinic; Dk Gore has left message for Nusrat Velarde with patient info. States best number for contact is [phone]. Declines any other assistance; denies needs.      JESUS Dolan

## 2021-12-23 NOTE — PROGRESS NOTES
Physician Progress Note      PATIENT:               Umu Aguila  CSN #:                  [id number]  :                       1995  ADMIT DATE:       2021 2:57 AM  DISCH DATE:  RESPONDING  PROVIDER #:        Zenaida WRIGHT          QUERY TEXT:    Pt admitted with DKA. ED notes- \"Sepsis Time Identified:  and UTI. \" If   possible, please document in progress notes and discharge summary:      The medical record reflects the following:  Risk Factors: homeless, Nausea pt to ED with c/o N/V for a few days at home. type 1 diabetic.  for EMS. ? Clinical Indicators: WBC 15.1, , afebrile, ED-He has leukocytosis with   neutrophilic predominance. He has findings of a urinary tract infection. He   is not acidotic  Treatment: Fluid Resuscitation Rational: at least 30mL/kg based on entered   actual weight at time of triage, ua, c&s, LA, serial labs, supportive care    Thank-You, Lorrie Villanueva RN, BSN, CCDS  Options provided:  -- Sepsis with UTI confirmed present on arrival  -- Sepsis ruled out, UTI POA  -- Sepsis and UTI ruled out  -- Other - I will add my own diagnosis  -- Disagree - Not applicable / Not valid  -- Disagree - Clinically unable to determine / Unknown  -- Refer to Clinical Documentation Reviewer    PROVIDER RESPONSE TEXT:    The diagnosis of sepsis and UTI ruled out.     Query created by: Seble Snowden on 2021 1:23 PM      Electronically signed by:  Zenaida WRIGHT 2021 3:00 PM

## 2021-12-23 NOTE — DISCHARGE INSTR - COC
Continuity of Care Form    Patient Name: Chay Dale   :  1995  MRN:  [id number]    Admit date:  2021  Discharge date:  ***    Code Status Order: Full Code   Advance Directives:      Admitting Physician:  Matt Colorado DO  PCP: No primary care provider on file. Discharging Nurse: [facility] Unit/Room#: 4348/8979-26  Discharging Unit Phone Number: ***    Emergency Contact:   Extended Emergency Contact Information  Primary Emergency Contact: none,to list  Mobile Phone: [phone]  Relation: Other  Secondary Emergency Contact: Davion Lewis  Mobile Phone: [phone]  Relation: Parent  Preferred language: English   needed? No    Past Surgical History:  History reviewed. No pertinent surgical history. Immunization History: There is no immunization history on file for this patient.     Active Problems:  Patient Active Problem List   Diagnosis Code    DKA, type 1, not at goal [facility]) E10.10       Isolation/Infection:   Isolation            No Isolation          Patient Infection Status       Infection Onset Added Last Indicated Last Indicated By Review Planned Expiration Resolved Resolved By    None active    Resolved    COVID-19 (Rule Out) 21 COVID-19, Rapid (Ordered)   21 Rule-Out Test Resulted            Nurse Assessment:  Last Vital Signs: /79   Pulse 85   Temp 98.4 °F (36.9 °C) (Oral)   Resp 16   Ht 6' 1\" (1.854 m)   Wt 175 lb (79.4 kg)   SpO2 97%   BMI 23.09 kg/m²     Last documented pain score (0-10 scale): Pain Level: 0  Last Weight:   Wt Readings from Last 1 Encounters:   21 175 lb (79.4 kg)     Mental Status:  {IP PT MENTAL STATUS:}    IV Access:  [facility] MARJAN IV ACCESS:620522025}    Nursing Mobility/ADLs:  Walking   {CHP DME RXUQ:793879384}  Transfer  {CHP DME GVNT:286858159}  Bathing  {CHP DME DTJO:699540775}  Dressing  {CHP DME XALI:354884187}  Toileting  {CHP DME YSEU:013422610}  Feeding  {CHP DME VDHJ:921414766}  Med Admin  {[facility] DME HLMZ:826568327}  Med Delivery   { MARJAN MED Delivery:396415568}    Wound Care Documentation and Therapy:        Elimination:  Continence: Bowel: {YES / AQ:41573}  Bladder: {YES / ZJ:87516}  Urinary Catheter: {Urinary Catheter:882445378}   Colostomy/Ileostomy/Ileal Conduit: {YES / TC:28542}       Date of Last BM: ***    Intake/Output Summary (Last 24 hours) at 2021 1322  Last data filed at 2021 1022  Gross per 24 hour   Intake 240 ml   Output --   Net 240 ml     I/O last 3 completed shifts:   In: 2493 [I.V.:2443; IV Piggyback:50]  Out: 56 [Urine:675]    Safety Concerns:     [facility] Safety Concerns:273682242}    Impairments/Disabilities:      [facility] Impairments/Disabilities:102551617}    Nutrition Therapy:  Current Nutrition Therapy:   [facility] Diet List:819605828}    Routes of Feeding: {[facility] DME Other Feedings:587007739}  Liquids: {Slp liquid thickness:75461}  Daily Fluid Restriction: {P DME Yes amt example:092629064}  Last Modified Barium Swallow with Video (Video Swallowing Test): {Done Not Done XOOR:144702301}    Treatments at the Time of Hospital Discharge:   Respiratory Treatments: ***  Oxygen Therapy:  {Therapy; copd oxygen:46838}  Ventilator:    { CC Vent TZGI:672960578}    Rehab Therapies: {THERAPEUTIC INTERVENTION:6319780907}  Weight Bearing Status/Restrictions: [address] [facility] Weight Bearin}  Other Medical Equipment (for information only, NOT a DME order):  {EQUIPMENT:051804047}  Other Treatments: ***    Patient's personal belongings (please select all that are sent with patient):  {[facility] DME Belongings:612452534}    RN SIGNATURE:  {Esignature:204410351}    CASE MANAGEMENT/SOCIAL WORK SECTION    Inpatient Status Date: ***    Readmission Risk Assessment Score:  Readmission Risk              Risk of Unplanned Readmission:  11           Discharging to Facility/ Agency   Name:   Address:  Phone:  Fax:    Dialysis Facility (if applicable) Name:  Address:  Dialysis Schedule:  Phone:  Fax:    / signature: {Esignature:708729278}    PHYSICIAN SECTION    Prognosis: {Prognosis:9220372596}    Condition at Discharge: Prakash Villanueva Patient Condition:510853274}    Rehab Potential (if transferring to Rehab): {Prognosis:5664396133}    Recommended Labs or Other Treatments After Discharge: ***    Physician Certification: I certify the above information and transfer of Pablo Lara  is necessary for the continuing treatment of the diagnosis listed and that he requires {Admit to Appropriate Level of Care:96207} for {GREATER/LESS:658983702} 30 days.      Update Admission H&P: {CHP DME Changes in DLVQS:928363392}    PHYSICIAN SIGNATURE:  {Esignature:862477367}

## 2021-12-24 LAB
ESTIMATED AVERAGE GLUCOSE: 326.4 MG/DL
HBA1C MFR BLD: 13 %

## 2021-12-26 LAB
BLOOD CULTURE, ROUTINE: NORMAL
CULTURE, BLOOD 2: NORMAL

## 2022-11-18 ENCOUNTER — HOSPITAL ENCOUNTER (EMERGENCY)
Age: 27
Discharge: HOME | End: 2022-11-18
Payer: MEDICAID

## 2022-11-18 ENCOUNTER — APPOINTMENT (OUTPATIENT)
Dept: GENERAL RADIOLOGY | Age: 27
End: 2022-11-18
Payer: MEDICAID

## 2022-11-18 VITALS
HEIGHT: 73 IN | WEIGHT: 160 LBS | DIASTOLIC BLOOD PRESSURE: 73 MMHG | SYSTOLIC BLOOD PRESSURE: 125 MMHG | OXYGEN SATURATION: 100 % | HEART RATE: 91 BPM | BODY MASS INDEX: 21.2 KG/M2 | TEMPERATURE: 98.1 F | RESPIRATION RATE: 15 BRPM

## 2022-11-18 DIAGNOSIS — E10.621: Primary | ICD-10-CM

## 2022-11-18 DIAGNOSIS — L97.401: Primary | ICD-10-CM

## 2022-11-18 LAB
A/G RATIO: 1.2 (ref 1.1–2.2)
ALBUMIN SERPL-MCNC: 3.8 G/DL (ref 3.4–5)
ALP BLD-CCNC: 89 U/L (ref 40–129)
ALT SERPL-CCNC: 19 U/L (ref 10–40)
ANION GAP SERPL CALCULATED.3IONS-SCNC: 8 MMOL/L (ref 3–16)
AST SERPL-CCNC: 24 U/L (ref 15–37)
BASOPHILS ABSOLUTE: 0.1 K/UL (ref 0–0.2)
BASOPHILS RELATIVE PERCENT: 1.2 %
BILIRUB SERPL-MCNC: 0.4 MG/DL (ref 0–1)
BUN BLDV-MCNC: 15 MG/DL (ref 7–20)
CALCIUM SERPL-MCNC: 9.1 MG/DL (ref 8.3–10.6)
CHLORIDE BLD-SCNC: 98 MMOL/L (ref 99–110)
CO2: 28 MMOL/L (ref 21–32)
CREAT SERPL-MCNC: 0.9 MG/DL (ref 0.9–1.3)
EOSINOPHILS ABSOLUTE: 0.1 K/UL (ref 0–0.6)
EOSINOPHILS RELATIVE PERCENT: 1.5 %
GFR SERPL CREATININE-BSD FRML MDRD: >60 ML/MIN/{1.73_M2}
GLUCOSE BLD-MCNC: 328 MG/DL (ref 70–99)
HCT VFR BLD CALC: 39.2 % (ref 40.5–52.5)
HEMOGLOBIN: 13.1 G/DL (ref 13.5–17.5)
LACTIC ACID, SEPSIS: 1.8 MMOL/L (ref 0.4–1.9)
LYMPHOCYTES ABSOLUTE: 3.1 K/UL (ref 1–5.1)
LYMPHOCYTES RELATIVE PERCENT: 41.4 %
MCH RBC QN AUTO: 29.5 PG (ref 26–34)
MCHC RBC AUTO-ENTMCNC: 33.5 G/DL (ref 31–36)
MCV RBC AUTO: 88 FL (ref 80–100)
MONOCYTES ABSOLUTE: 0.7 K/UL (ref 0–1.3)
MONOCYTES RELATIVE PERCENT: 8.8 %
NEUTROPHILS ABSOLUTE: 3.5 K/UL (ref 1.7–7.7)
NEUTROPHILS RELATIVE PERCENT: 47.1 %
PDW BLD-RTO: 12.9 % (ref 12.4–15.4)
PLATELET # BLD: 199 K/UL (ref 135–450)
PMV BLD AUTO: 8.7 FL (ref 5–10.5)
POTASSIUM REFLEX MAGNESIUM: 4.3 MMOL/L (ref 3.5–5.1)
PROCALCITONIN: 0.03 NG/ML (ref 0–0.15)
RBC # BLD: 4.46 M/UL (ref 4.2–5.9)
SODIUM BLD-SCNC: 134 MMOL/L (ref 136–145)
TOTAL PROTEIN: 7 G/DL (ref 6.4–8.2)
WBC # BLD: 7.5 K/UL (ref 4–11)

## 2022-11-18 PROCEDURE — 73630 X-RAY EXAM OF FOOT: CPT

## 2022-11-18 PROCEDURE — 83605 ASSAY OF LACTIC ACID: CPT

## 2022-11-18 PROCEDURE — 84145 PROCALCITONIN (PCT): CPT

## 2022-11-18 PROCEDURE — 85025 COMPLETE CBC W/AUTO DIFF WBC: CPT

## 2022-11-18 PROCEDURE — 99284 EMERGENCY DEPT VISIT MOD MDM: CPT

## 2022-11-18 PROCEDURE — 80053 COMPREHEN METABOLIC PANEL: CPT

## 2022-11-18 RX ORDER — DOXYCYCLINE HYCLATE 100 MG
100 TABLET ORAL 2 TIMES DAILY
Qty: 14 TABLET | Refills: 0 | Status: SHIPPED | OUTPATIENT
Start: 2022-11-18 | End: 2022-11-25

## 2022-11-18 RX ORDER — CEPHALEXIN 500 MG/1
500 CAPSULE ORAL 4 TIMES DAILY
Qty: 28 CAPSULE | Refills: 0 | Status: SHIPPED | OUTPATIENT
Start: 2022-11-18 | End: 2022-11-25

## 2022-11-18 ASSESSMENT — PAIN - FUNCTIONAL ASSESSMENT
PAIN_FUNCTIONAL_ASSESSMENT: NONE - DENIES PAIN
PAIN_FUNCTIONAL_ASSESSMENT: PREVENTS OR INTERFERES WITH MANY ACTIVE NOT PASSIVE ACTIVITIES
PAIN_FUNCTIONAL_ASSESSMENT: 0-10

## 2022-11-18 ASSESSMENT — PAIN DESCRIPTION - DESCRIPTORS: DESCRIPTORS: ACHING

## 2022-11-18 ASSESSMENT — PAIN DESCRIPTION - FREQUENCY: FREQUENCY: INTERMITTENT

## 2022-11-18 ASSESSMENT — PAIN DESCRIPTION - LOCATION: LOCATION: FOOT

## 2022-11-18 ASSESSMENT — PAIN DESCRIPTION - ORIENTATION: ORIENTATION: LEFT

## 2022-11-18 ASSESSMENT — PAIN DESCRIPTION - PAIN TYPE: TYPE: ACUTE PAIN

## 2022-11-18 ASSESSMENT — PAIN SCALES - GENERAL: PAINLEVEL_OUTOF10: 5

## 2022-11-19 ASSESSMENT — ENCOUNTER SYMPTOMS
SHORTNESS OF BREATH: 0
BACK PAIN: 0
RHINORRHEA: 0
CONSTIPATION: 0
ABDOMINAL PAIN: 0
CHEST TIGHTNESS: 0
DIARRHEA: 0
VOMITING: 0
NAUSEA: 0
SORE THROAT: 0
COUGH: 0

## 2022-11-19 NOTE — ED PROVIDER NOTES
[facility]  ED  EMERGENCY DEPARTMENT ENCOUNTER        Pt Name: Joe Martinez  MRN: [id number]  [place] 1995  Date of evaluation: 11/18/2022  Provider: Eldora Bence, APRN - CNP  PCP: No primary care provider on file. This patient was not seen and evaluated by the attending physician No att. providers found. I have evaluated this patient. My supervising physician was available for consultation. CHIEF COMPLAINT       Chief Complaint   Patient presents with    Skin Ulcer     Noticed a fluid filled blister on left heal today. States he feels like he is stepping on a rock. Has type 1 DM with a history of toe amputation to left toe. HISTORY OF PRESENT ILLNESS   (Location/Symptom, Timing/Onset, Context/Setting, Quality, Duration, Modifying Factors, Severity)  Note limiting factors. Joe Martinez is a 33 y.o. male who presents via private car for concern for skin ulcer to left foot. Onset was 3 days ago. Duration has been since the onset. Context includes patient presents to the emergency department today with complaints of a wound to his left foot. He does have history of poorly controlled type 1 diabetes. 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 He states that 3 days ago he was in the shower and noticed a tingling sensation to the heel of his left foot. He states this morning when he was in the shower he noticed that there was an area of swelling on the heel that was painful when he bears weight on it. He denies any fevers, chest pain, palpitations or swelling in his extremities. He denies any shortness of breath, cough, congestion. He denies any abdominal pain, nausea vomiting, diarrhea or constipation. He denies any changes in sensation to the left foot but does state that he has a history of diabetic neuropathy.   His diabetes is poorly controlled he states he uses 40 to 60 units of insulin per day but has no other management. He states sugars generally run 250-350. He does have an appointment on 12/5 to establish care with a new primary care physician who will help him manage his diabetes. He denies any injuries to the foot that he is aware of. Quality is sharp and stabbing with radiation to his left heel. Alleviating factors include rest and elevation. Aggravating factors include palpation, bearing weight. Pain is 5/10. Nothing has been used for pain today. Chart review reveals pt has significant PMHx of diabetes, thyroid disorder. They take Lantus. Nursing Notes were all reviewed and agreed with or any disagreements were addressed  in the HPI. Pt was seen during the [place] 19 pandemic. Appropriate PPE worn by ME during patient encounters. Pt seen during a time with constrained hospital bed capacity and other potential inpatient and outpatient resources were constrained due to the viral pandemic. REVIEW OF SYSTEMS    (2-9 systems for level 4, 10 or more for level 5)     Review of Systems   Constitutional:  Negative for chills, diaphoresis and fever. HENT:  Negative for congestion, rhinorrhea and sore throat. Respiratory:  Negative for cough, chest tightness and shortness of breath. Cardiovascular:  Negative for chest pain and leg swelling. Gastrointestinal:  Negative for abdominal pain, constipation, diarrhea, nausea and vomiting. Musculoskeletal:  Negative for back pain and neck pain. Skin:  Positive for wound. Negative for rash. Wound to heel of left foot       Positives and Pertinent negatives as per HPI. Except as noted abovein the ROS, all other systems were reviewed and negative. PAST MEDICAL HISTORY     Past Medical History:   Diagnosis Date    Diabetes mellitus ([facility] [address]     Thyroid disease          SURGICAL HISTORY   History reviewed. No pertinent surgical history.       CURRENTMEDICATIONS       Discharge Medication List as of 11/18/2022 10:04 PM        CONTINUE these medications which have NOT CHANGED    Details   insulin glargine (LANTUS) 100 UNIT/ML injection vial Inject into the skin daily 34 unitsHistorical Med               ALLERGIES     Sulfa antibiotics    FAMILYHISTORY     History reviewed. No pertinent family history. SOCIAL HISTORY       Social History     Socioeconomic History    Marital status: Single     Spouse name: None    Number of children: None    Years of education: None    Highest education level: None   Tobacco Use    Smoking status: Some Days     Types: Cigarettes    Smokeless tobacco: Never   Vaping Use    Vaping Use: Never used   Substance and Sexual Activity    Alcohol use: Not Currently    Drug use: Yes     Types: Marijuana (Weed)       SCREENINGS    [place] Coma Scale  Eye Opening: Spontaneous  Best Verbal Response: Oriented  Best Motor Response: Obeys commands  [place] Coma Scale Score: 15        PHYSICAL EXAM    (up to 7 for level 4, 8 or more for level 5)     ED Triage Vitals [11/18/22 1917]   BP Temp Temp Source Heart Rate Resp SpO2 Height Weight   (!) 131/92 98 °F (36.7 °C) Oral 98 15 99 % 6' 1\" (1.854 m) 160 lb (72.6 kg)       Physical Exam  Vitals and nursing note reviewed. Constitutional:       Appearance: Normal appearance. He is not ill-appearing or diaphoretic. HENT:      Head: Normocephalic and atraumatic. Right Ear: External ear normal.      Left Ear: External ear normal.      Mouth/Throat:      Mouth: Mucous membranes are moist.      Pharynx: Oropharynx is clear. Eyes:      General:         Right eye: No discharge. Left eye: No discharge. Cardiovascular:      Rate and Rhythm: Normal rate and regular rhythm. Pulses: Normal pulses. Dorsalis pedis pulses are 2+ on the right side and 2+ on the left side. Posterior tibial pulses are 2+ on the right side and 2+ on the left side. Heart sounds: Normal heart sounds. No murmur heard. No friction rub. No gallop.    Pulmonary:      Effort: Pulmonary effort is normal. No respiratory distress. Breath sounds: Normal breath sounds. No stridor. No wheezing, rhonchi or rales. Musculoskeletal:         General: Normal range of motion. Cervical back: Normal range of motion and neck supple. Right lower leg: No edema. Left lower leg: No edema. Feet:    Feet:      Left foot:      Skin integrity: Ulcer and dry skin present. No blister, skin breakdown, erythema, warmth, callus or fissure. Toenail Condition: Left toenails are normal.   Skin:     General: Skin is warm and dry. Capillary Refill: Capillary refill takes less than 2 seconds. Neurological:      General: No focal deficit present. Mental Status: He is alert and oriented to person, place, and time. Psychiatric:         Mood and Affect: Mood normal.         Behavior: Behavior normal.     PHYSICAL EXAM  /73   Pulse 91   Temp 98.1 °F (36.7 °C) (Oral)   Resp 15   Ht 6' 1\" (1.854 m)   Wt 160 lb (72.6 kg)   SpO2 100%   BMI 21.11 kg/m²               DIAGNOSTIC RESULTS   LABS:    Labs Reviewed   CBC WITH AUTO DIFFERENTIAL - Abnormal; Notable for the following components:       Result Value    Hemoglobin 13.1 (*)     Hematocrit 39.2 (*)     All other components within normal limits   COMPREHENSIVE METABOLIC PANEL W/ REFLEX TO MG FOR LOW K - Abnormal; Notable for the following components:    Sodium 134 (*)     Chloride 98 (*)     Glucose 328 (*)     All other components within normal limits   LACTATE, SEPSIS   PROCALCITONIN       All other labs were within normal range or not returned as of this dictation. EKG: All EKG's are interpreted by the Emergency Department Physician who either signs orCo-signs this chart in the absence of a cardiologist.  Please see their note for interpretation of EKG.       RADIOLOGY:   Non-plain film images such as CT, Ultrasound and MRI are read by the radiologist. Plain radiographic images are visualized andpreliminarily interpreted by the  ED Provider with the below findings:        Interpretation [facility] Radiologist below, if available at the time of this note:    XR FOOT LEFT (MIN 3 VIEWS)   Final Result   Ulceration of the lateral aspect of the foot with a 4 mm opacity in this area   that could represent a calcification or foreign body. No acute fracture or evidence of osteomyelitis. No results found. PROCEDURES   Unless otherwise noted below, none     Procedures    CRITICAL CARE TIME   N/A    CONSULTS:  None      EMERGENCY DEPARTMENT COURSE and DIFFERENTIALDIAGNOSIS/MDM:   Vitals:    Vitals:    11/18/22 1917 11/18/22 2209   BP: (!) 131/92 125/73   Pulse: 98 91   Resp: 15 15   Temp: 98 °F (36.7 °C) 98.1 °F (36.7 °C)   TempSrc: Oral Oral   SpO2: 99% 100%   Weight: 160 lb (72.6 kg)    Height: 6' 1\" (1.854 m)        Patient was given thefollowing medications:  Medications - No data to display    PDMP Monitoring:    Last PDMP [place] as Reviewed [facility]):  Review User Review Instant Review Result            Urine Drug Screenings (1 yr)    No resulted procedures found. Medication Contract and Consent for Opioid Use Documents Filed        No documents found                    MDM:   This patient was seen and evaluated by myself. He presents to the emergency department today with concerns for diabetic foot ulcer to the left heel. On exam he is alert and oriented, hemodynamically stable nontoxic in appearance. He will have a work-up in the emergency department consisting of laboratory studies and imaging. He denies need for pain control at this time however states he will notify staff if this changes. Laboratory studies and images were evaluated and reviewed with the patient. CBC negative for leukocytosis. CMP does reveal mild hyponatremia 134 however patient is tolerating p.o.  Glucose 328 and patient endorses this is relatively baseline for him. Lactic negative at 1.8. Procalcitonin negative at 0.03.   X-ray left foot interpreted by the radiologist for ulceration of the lateral aspect of the foot with a 4 mm opacity in this area that could represent calcification or foreign body with no acute fracture or evidence of osteomyelitis. I did discuss the findings with the patient. He continues to deny need for pain control. I discussed with him a plan for discharge including starting oral antibiotics as he does have a history of osteomyelitis and an amputation. He was agreeable to this plan. I discussed with him the importance of following up with a PCP and he states he does have an appointment scheduled for the beginning of December. He states he has adequate insulin at this time to continue dosing at his normal baseline. I did provide him with a referral for podiatry and he stated he had seen podiatry where he lived previously and was grateful for the referral.  I did instruct him on use of the antibiotics and instructed him to complete the entire course. He was provided with strict return precautions for the emergency department including but not limited to worsening pain, increasing size of the ulceration, redness, streaking from the wound, swelling to his foot or ankle or other concerns. He was instructed to leave the wound intact without attempting to drain it as to not introduce bacteria. Patient verbalized understanding of all discharge teaching and was ultimately discharged in stable condition with all questions answered        Is this patient to be included in the SEP-1 Core Measure due to severe sepsis or septic shock? No   Exclusion criteria - the patient is NOT to be included for SEP-1 Core Measure due to:  2+ SIRS criteria are not met    Discharge Time out:  CC Reviewed Yes   Test Results Yes     Vitals:    11/18/22 2209   BP: 125/73   Pulse: 91   Resp: 15   Temp: 98.1 °F (36.7 °C)   SpO2: 100%              FINAL IMPRESSION      1.  Diabetic ulcer of heel associated with type 1 diabetes mellitus, limited to breakdown of skin, unspecified laterality [facility])          DISPOSITION/PLAN   DISPOSITION Decision To Discharge 11/18/2022 09:54:34 PM      PATIENT REFERREDTO:  [facility]  [address], [address]  [address] [facility]          [facility]  ED  [address]  [facility]  [phone]    As needed, If symptoms worsen    [facility]) Pre-Services  [phone]        DISCHARGE MEDICATIONS:  Discharge Medication List as of 11/18/2022 10:04 PM        START taking these medications    Details   cephALEXin (KEFLEX) 500 MG capsule Take 1 capsule by mouth 4 times daily for 7 days, Disp-28 capsule, R-0Normal      doxycycline hyclate (VIBRA-TABS) 100 MG tablet Take 1 tablet by mouth 2 times daily for 7 days, Disp-14 tablet, R-0Normal             DISCONTINUED MEDICATIONS:  Discharge Medication List as of 11/18/2022 10:04 PM                 (Please note that portions ofthis note were completed with a voice recognition program.  Efforts were made to edit the dictations but occasionally words are mis-transcribed.)    ALFREDO Skinner CNP (electronically signed)       ALFREDO Skinner CNP  11/19/22 0120

## 2022-11-19 NOTE — DISCHARGE INSTRUCTIONS
If you notice signs of worsening infection including redness, warmth, swelling, streaking from the foot or fevers please return to the ER for evaluation.

## 2022-11-27 ENCOUNTER — APPOINTMENT (OUTPATIENT)
Dept: GENERAL RADIOLOGY | Age: 27
End: 2022-11-27
Payer: MEDICAID

## 2022-11-27 ENCOUNTER — HOSPITAL ENCOUNTER (EMERGENCY)
Age: 27
Discharge: HOME | End: 2022-11-27
Attending: STUDENT IN AN ORGANIZED HEALTH CARE EDUCATION/TRAINING PROGRAM
Payer: MEDICAID

## 2022-11-27 VITALS
DIASTOLIC BLOOD PRESSURE: 76 MMHG | OXYGEN SATURATION: 98 % | TEMPERATURE: 98.5 F | HEART RATE: 89 BPM | SYSTOLIC BLOOD PRESSURE: 111 MMHG | RESPIRATION RATE: 18 BRPM

## 2022-11-27 DIAGNOSIS — L97.929 DIABETIC ULCER OF LEFT LOWER LEG (HCC): ICD-10-CM

## 2022-11-27 DIAGNOSIS — R42 LIGHTHEADEDNESS: Primary | ICD-10-CM

## 2022-11-27 DIAGNOSIS — E11.622 DIABETIC ULCER OF LEFT LOWER LEG (HCC): ICD-10-CM

## 2022-11-27 DIAGNOSIS — R11.0 NAUSEA: ICD-10-CM

## 2022-11-27 DIAGNOSIS — R73.9 HYPERGLYCEMIA: ICD-10-CM

## 2022-11-27 DIAGNOSIS — R53.83 OTHER FATIGUE: ICD-10-CM

## 2022-11-27 LAB
A/G RATIO: 1.1 (ref 1.1–2.2)
ALBUMIN SERPL-MCNC: 4 G/DL (ref 3.4–5)
ALP BLD-CCNC: 96 U/L (ref 40–129)
ALT SERPL-CCNC: 16 U/L (ref 10–40)
ANION GAP SERPL CALCULATED.3IONS-SCNC: 12 MMOL/L (ref 3–16)
AST SERPL-CCNC: 24 U/L (ref 15–37)
BASE EXCESS VENOUS: 1.3 MMOL/L (ref -3–3)
BASOPHILS ABSOLUTE: 0.1 K/UL (ref 0–0.2)
BASOPHILS RELATIVE PERCENT: 0.5 %
BILIRUB SERPL-MCNC: 0.5 MG/DL (ref 0–1)
BILIRUBIN URINE: NEGATIVE
BLOOD, URINE: NEGATIVE
BUN BLDV-MCNC: 28 MG/DL (ref 7–20)
CALCIUM SERPL-MCNC: 9.6 MG/DL (ref 8.3–10.6)
CARBOXYHEMOGLOBIN: 3.5 % (ref 0–1.5)
CHLORIDE BLD-SCNC: 96 MMOL/L (ref 99–110)
CLARITY: ABNORMAL
CO2: 22 MMOL/L (ref 21–32)
COLOR: YELLOW
CREAT SERPL-MCNC: 1 MG/DL (ref 0.9–1.3)
EOSINOPHILS ABSOLUTE: 0.1 K/UL (ref 0–0.6)
EOSINOPHILS RELATIVE PERCENT: 0.8 %
GFR SERPL CREATININE-BSD FRML MDRD: >60 ML/MIN/{1.73_M2}
GLUCOSE BLD-MCNC: 211 MG/DL (ref 70–99)
GLUCOSE BLD-MCNC: 219 MG/DL (ref 70–99)
GLUCOSE URINE: >=1000 MG/DL
HCO3 VENOUS: 26.3 MMOL/L (ref 23–29)
HCT VFR BLD CALC: 43.7 % (ref 40.5–52.5)
HEMOGLOBIN: 15 G/DL (ref 13.5–17.5)
INFLUENZA A: NOT DETECTED
INFLUENZA B: NOT DETECTED
KETONES, URINE: 15 MG/DL
LACTIC ACID, SEPSIS: 1.6 MMOL/L (ref 0.4–1.9)
LEUKOCYTE ESTERASE, URINE: ABNORMAL
LYMPHOCYTES ABSOLUTE: 3.9 K/UL (ref 1–5.1)
LYMPHOCYTES RELATIVE PERCENT: 24.4 %
MCH RBC QN AUTO: 30.2 PG (ref 26–34)
MCHC RBC AUTO-ENTMCNC: 34.3 G/DL (ref 31–36)
MCV RBC AUTO: 87.9 FL (ref 80–100)
METHEMOGLOBIN VENOUS: 0.4 %
MICROSCOPIC EXAMINATION: YES
MONOCYTES ABSOLUTE: 1.3 K/UL (ref 0–1.3)
MONOCYTES RELATIVE PERCENT: 8.1 %
NEUTROPHILS ABSOLUTE: 10.6 K/UL (ref 1.7–7.7)
NEUTROPHILS RELATIVE PERCENT: 66.2 %
NITRITE, URINE: NEGATIVE
O2 SAT, VEN: 85 %
O2 THERAPY: ABNORMAL
PCO2, VEN: 42.9 MMHG (ref 40–50)
PDW BLD-RTO: 13 % (ref 12.4–15.4)
PERFORMED ON: ABNORMAL
PH UA: 6 (ref 5–8)
PH VENOUS: 7.41 (ref 7.35–7.45)
PLATELET # BLD: 250 K/UL (ref 135–450)
PMV BLD AUTO: 9.3 FL (ref 5–10.5)
PO2, VEN: 46.2 MMHG (ref 25–40)
POTASSIUM REFLEX MAGNESIUM: 4.6 MMOL/L (ref 3.5–5.1)
PROCALCITONIN: 0.13 NG/ML (ref 0–0.15)
PROTEIN UA: NEGATIVE MG/DL
RBC # BLD: 4.97 M/UL (ref 4.2–5.9)
RBC UA: ABNORMAL /HPF (ref 0–4)
SARS-COV-2 RNA, RT PCR: NOT DETECTED
SODIUM BLD-SCNC: 130 MMOL/L (ref 136–145)
SPECIFIC GRAVITY UA: 1.02 (ref 1–1.03)
TCO2 CALC VENOUS: 28 MMOL/L
TOTAL PROTEIN: 7.7 G/DL (ref 6.4–8.2)
URINE REFLEX TO CULTURE: ABNORMAL
URINE TYPE: ABNORMAL
UROBILINOGEN, URINE: 0.2 E.U./DL
WBC # BLD: 16.1 K/UL (ref 4–11)
WBC UA: ABNORMAL /HPF (ref 0–5)
YEAST: PRESENT /HPF

## 2022-11-27 PROCEDURE — 87636 SARSCOV2 & INF A&B AMP PRB: CPT

## 2022-11-27 PROCEDURE — 71045 X-RAY EXAM CHEST 1 VIEW: CPT

## 2022-11-27 PROCEDURE — 80053 COMPREHEN METABOLIC PANEL: CPT

## 2022-11-27 PROCEDURE — 6370000000 HC RX 637 (ALT 250 FOR IP): Performed by: PHYSICIAN ASSISTANT

## 2022-11-27 PROCEDURE — 82010 KETONE BODYS QUAN: CPT

## 2022-11-27 PROCEDURE — 87040 BLOOD CULTURE FOR BACTERIA: CPT

## 2022-11-27 PROCEDURE — 85025 COMPLETE CBC W/AUTO DIFF WBC: CPT

## 2022-11-27 PROCEDURE — 6360000002 HC RX W HCPCS: Performed by: PHYSICIAN ASSISTANT

## 2022-11-27 PROCEDURE — 96365 THER/PROPH/DIAG IV INF INIT: CPT

## 2022-11-27 PROCEDURE — 93005 ELECTROCARDIOGRAM TRACING: CPT | Performed by: PHYSICIAN ASSISTANT

## 2022-11-27 PROCEDURE — 84145 PROCALCITONIN (PCT): CPT

## 2022-11-27 PROCEDURE — 81001 URINALYSIS AUTO W/SCOPE: CPT

## 2022-11-27 PROCEDURE — 99285 EMERGENCY DEPT VISIT HI MDM: CPT

## 2022-11-27 PROCEDURE — 2580000003 HC RX 258: Performed by: PHYSICIAN ASSISTANT

## 2022-11-27 PROCEDURE — 82803 BLOOD GASES ANY COMBINATION: CPT

## 2022-11-27 PROCEDURE — 83605 ASSAY OF LACTIC ACID: CPT

## 2022-11-27 RX ORDER — INSULIN GLARGINE 100 [IU]/ML
34 INJECTION, SOLUTION SUBCUTANEOUS NIGHTLY
Qty: 10 ADJUSTABLE DOSE PRE-FILLED PEN SYRINGE | Refills: 0 | Status: SHIPPED | OUTPATIENT
Start: 2022-11-27 | End: 2022-12-07

## 2022-11-27 RX ORDER — PEN NEEDLE, DIABETIC 29 GAUGE
1 NEEDLE, DISPOSABLE MISCELLANEOUS DAILY
Qty: 10 EACH | Refills: 0 | Status: SHIPPED | OUTPATIENT
Start: 2022-11-27 | End: 2022-12-07

## 2022-11-27 RX ORDER — SODIUM CHLORIDE 9 MG/ML
1000 INJECTION, SOLUTION INTRAVENOUS ONCE
Status: COMPLETED | OUTPATIENT
Start: 2022-11-27 | End: 2022-11-27

## 2022-11-27 RX ORDER — INSULIN LISPRO 100 [IU]/ML
3 INJECTION, SOLUTION INTRAVENOUS; SUBCUTANEOUS NIGHTLY
Qty: 0.3 ML | Refills: 0 | Status: SHIPPED | OUTPATIENT
Start: 2022-11-27 | End: 2022-11-27 | Stop reason: ALTCHOICE

## 2022-11-27 RX ORDER — CEPHALEXIN 500 MG/1
500 CAPSULE ORAL 2 TIMES DAILY
Qty: 14 CAPSULE | Refills: 0 | Status: SHIPPED | OUTPATIENT
Start: 2022-11-27 | End: 2022-12-04

## 2022-11-27 RX ORDER — FLUCONAZOLE 100 MG/1
200 TABLET ORAL ONCE
Status: COMPLETED | OUTPATIENT
Start: 2022-11-27 | End: 2022-11-27

## 2022-11-27 RX ORDER — INSULIN LISPRO 100 [IU]/ML
10 INJECTION, SOLUTION INTRAVENOUS; SUBCUTANEOUS
Qty: 3 ML | Refills: 0 | Status: SHIPPED | OUTPATIENT
Start: 2022-11-27 | End: 2022-12-07

## 2022-11-27 RX ADMIN — FLUCONAZOLE 200 MG: 100 TABLET ORAL at 21:53

## 2022-11-27 RX ADMIN — CEFTRIAXONE SODIUM 1000 MG: 1 INJECTION, POWDER, FOR SOLUTION INTRAMUSCULAR; INTRAVENOUS at 21:52

## 2022-11-27 RX ADMIN — SODIUM CHLORIDE 1000 ML: 9 INJECTION, SOLUTION INTRAVENOUS at 19:30

## 2022-11-27 ASSESSMENT — ENCOUNTER SYMPTOMS
SHORTNESS OF BREATH: 0
SORE THROAT: 0
SINUS PAIN: 0
RHINORRHEA: 0
DIARRHEA: 0
EYE REDNESS: 0
ABDOMINAL PAIN: 0
CHEST TIGHTNESS: 0
NAUSEA: 0
VOMITING: 0
EYE DISCHARGE: 0
COUGH: 0
CONSTIPATION: 0
SINUS PRESSURE: 0

## 2022-11-27 NOTE — ED PROVIDER NOTES
[address] [facility]  ED  EMERGENCY DEPARTMENT ENCOUNTER        Pt Name: Petr Carrillo  MRN: [id number]  [place] 1995  Date of evaluation: 11/27/2022  Provider: Molly Burgess PA-C  PCP: No primary care provider on file. ED Attending: Brittney Gates MD      This patient was not seen and evaluated by the attending physician Brittney Gates MD.    I have independently evaluated this patient. CHIEF COMPLAINT       Chief Complaint   Patient presents with    Emesis    Dizziness     Worried about DKA       HISTORY OF PRESENT ILLNESS   (Location/Symptom, Timing/Onset, Context/Setting, Quality, Duration, Modifying Factors, Severity)  Note limiting factors. Petr Carrillo is a 33 y.o. male with a past medical history of DM1, hypothyroidism, noncompliance, osteomyelitis, tobacco and marijuana use, presents via private vehicle from home with report of generalized diffuse fatigue. Advising that he feels like he is fighting infection. Patient advised he has chronic leg wounds to bilateral lower extremities. Patient indicates he has been out of his NovoLog for several weeks as he has been unable to get it filled. Uncertain if fevers of chills. Nursing Notes were all reviewed and agreed with or any disagreements were addressed  in the HPI. REVIEW OF SYSTEMS  (2-9 systems for level 4, 10 or more for level 5)     Review of Systems   Constitutional:  Positive for fatigue. Negative for chills and fever. HENT: Negative. Negative for congestion, rhinorrhea, sinus pressure, sinus pain and sore throat. Eyes:  Negative for discharge, redness and visual disturbance. Respiratory:  Negative for cough, chest tightness and shortness of breath. Cardiovascular:  Negative for chest pain and palpitations. Gastrointestinal:  Negative for abdominal pain, constipation, diarrhea, nausea and vomiting. Genitourinary:  Negative for difficulty urinating, dysuria and frequency.    Musculoskeletal: Negative. Skin:  Positive for wound. Neurological: Negative. Negative for dizziness, weakness, numbness and headaches. Psychiatric/Behavioral: Negative. All other systems reviewed and are negative. Positivesand Pertinent negatives as per HPI. Except as noted above in the ROS, all other systems were reviewed and negative. PAST MEDICAL HISTORY     Past Medical History:   Diagnosis Date    Diabetes mellitus ([facility] [address]     Thyroid disease          SURGICAL HISTORY     No past surgical history on file. CURRENT MEDICATIONS       Discharge Medication List as of 11/27/2022 11:05 PM            ALLERGIES     Sulfa antibiotics    FAMILY HISTORY     No family history on file. SOCIAL HISTORY       Social History     Socioeconomic History    Marital status: Single   Tobacco Use    Smoking status: Some Days     Types: Cigarettes    Smokeless tobacco: Never   Vaping Use    Vaping Use: Never used   Substance and Sexual Activity    Alcohol use: Not Currently    Drug use: Yes     Types: Marijuana (Weed)       SCREENINGS     NIH Score       [facility]     Heart Score         PHYSICAL EXAM    (up to 7 for level 4, 8 ormore for level 5)     ED Triage Vitals [11/27/22 1851]   BP Temp Temp Source Heart Rate Resp SpO2 Height Weight   (!) 86/51 98.6 °F (37 °C) Oral (!) 105 18 98 % -- --     GENERAL APPEARANCE: Awake and alert. Cooperative. No acute distress. HEAD: Normocephalic. Atraumatic. EYES: PERRL. EOM's grossly intact. ENT: Mucous membranes are moist.   NECK: Supple. Normal ROM. CHEST: Equal symmetric chest rise. Tachycardia  LUNGS: Breathing is unlabored. Speaking comfortably in full sentences. LCAB  Abdomen: Nondistended, non tender  EXTREMITIES: MAEE. No acute deformities. SKIN: Warm and dry. Wounds to BLE see below  NEUROLOGICAL: Alert and oriented. Strength is 5/5 in all extremities and sensation is intact.                   DIAGNOSTIC RESULTS   LABS:    Labs Reviewed   CBC WITH AUTO DIFFERENTIAL - Abnormal; Notable for the following components:       Result Value    WBC 16.1 (*)     Neutrophils Absolute 10.6 (*)     All other components within normal limits   COMPREHENSIVE METABOLIC PANEL W/ REFLEX TO MG FOR LOW K - Abnormal; Notable for the following components:    Sodium 130 (*)     Chloride 96 (*)     Glucose 211 (*)     BUN 28 (*)     All other components within normal limits   URINALYSIS WITH REFLEX TO CULTURE - Abnormal; Notable for the following components:    Clarity, UA CLOUDY (*)     Glucose, Ur >=1000 (*)     Ketones, Urine 15 (*)     Leukocyte Esterase, Urine SMALL (*)     All other components within normal limits   BLOOD GAS, VENOUS - Abnormal; Notable for the following components:    pO2, Vladimir 46.2 (*)     Carboxyhemoglobin 3.5 (*)     All other components within normal limits   MICROSCOPIC URINALYSIS - Abnormal; Notable for the following components:    WBC, UA 6-9 (*)     Yeast, UA Present (*)     All other components within normal limits   POCT GLUCOSE - Abnormal; Notable for the following components:    POC Glucose 219 (*)     All other components within normal limits   COVID-19 & INFLUENZA COMBO   CULTURE, BLOOD 1   CULTURE, BLOOD 2   PROCALCITONIN   LACTATE, SEPSIS   BETA-HYDROXYBUTYRATE   LACTATE, SEPSIS   POCT GLUCOSE       All other labs were within normal range or notreturned as of this dictation. EKG: All EKG's are interpreted by the Emergency Department Physician who either signs or Co-signs this chart in the absence of a cardiologist.  Please see their note for interpretation of EKG. RADIOLOGY:         Interpretation per the Radiologist below, if available at the time of this note:    XR CHEST PORTABLE   Final Result   No acute cardiopulmonary process identified. CRITICAL CARE TIME   Total critical care time today provided was 32 minutes. This excludes seperately billable procedures and family discussion time.  Provided for concern for possible DKA, concern for possible hypotension requiring intervention with concern for potential decompensation. Is this patient to be included in the SEP-1 Core Measure due to severe sepsis or septic shock? No   Exclusion criteria - the patient is NOT to be included for SEP-1 Core Measure due to:  2+ SIRS criteria are not met     CONSULTS:  None      EMERGENCY DEPARTMENT COURSE and DIFFERENTIAL DIAGNOSIS/MDM:   Vitals:    Vitals:    11/27/22 1851 11/27/22 2130 11/27/22 2303   BP: (!) 86/51 111/71 111/76   Pulse: (!) 105 93 89   Resp: 18 18    Temp: 98.6 °F (37 °C) 98.5 °F (36.9 °C)    TempSrc: Oral Oral    SpO2: 98% 97% 98%       Patient was given the following medications:  Medications   0.9 % sodium chloride infusion (0 mLs IntraVENous Stopped 11/27/22 2306)   fluconazole (DIFLUCAN) tablet 200 mg (200 mg Oral Given 11/27/22 2153)   cefTRIAXone (ROCEPHIN) 1,000 mg in dextrose 5 % 50 mL IVPB mini-bag (0 mg IntraVENous Stopped 11/27/22 2222)         Afebrile, stable, patient presents to the ED for evaluation. Seen in conjunction with attending ED provider who agrees with assessment and plan. Nontoxic patient in no acute distress although a chronic diabetic type I patient with issues with compliance. He advised that he had given himself insulin prior to coming into the ER he showed up and he was mildly hypertensive however once his fluids were administered this significantly improved. Patient was not hypoxic. Was not having any pain. Confirm patient was not in DKA had no evidence of anion gap no change in CO2. pH was normal and VBH. Beta hydroxybutyrate is pending at the time of discharge. Discussed with patient the barriers to him staying on top of his diabetes and indicated that he had been trying to get by due to issues with getting his medications refilled. He advised he does have an appointment on December 5 with endocrinology/PCP. Patient's medications are written.   He is encouraged very close follow-up with a low suspicion for return to the department. Also has urine yeast and possible bacterial infection he is given antibiotics and a Diflucan to treat this. Lower extremity wounds he is given a referral to podiatry. We will no indication that a admit the patient at this time he is appropriate for discharge in stable condition. l questions are answered. Indications for return to the ED are discussed. Patient is advised if any new or worsening symptoms arise they should immediately return to the emergency room. Follow-up with primary care in 1-2 days. The patient tolerated their visit well. They were seen and evaluated by the Ariella Fleming MD who agreed with the assessment and plan. The patient and / or the family were informed of the results of any tests, a time was given to answer questions, a plan was proposed and they agreed Juani Mcmanus. FINAL IMPRESSION      1. Lightheadedness    2. Nausea    3. Other fatigue    4. Diabetic ulcer of left lower leg ([address]    5.  Hyperglycemia          DISPOSITION/PLAN   DISPOSITION Decision To Discharge 11/27/2022 11:05:48 PM      PATIENT REFERRED TO:  Unique Johns, DO  [address] [place] [address] [address]  36 [facility], [address] [place] [address]          DISCHARGE MEDICATIONS:  Discharge Medication List as of 11/27/2022 11:05 PM        START taking these medications    Details   cephALEXin (KEFLEX) 500 MG capsule Take 1 capsule by mouth 2 times daily for 7 days, Disp-14 capsule, R-0Print      Insulin Pen Needle ([employer] PEN NEEDLES 29G) 29G X 12MM MISC DAILY Starting Sun 11/27/2022, Until Wed 12/7/2022, For 10 days, Disp-10 each, R-0, Print      insulin lispro, 1 Unit Dial, (HUMALOG KWIKPEN) 100 UNIT/ML SOPN Inject 10 Units into the skin 3 times daily (before meals) for 10 days, Disp-3 mL, R-0Print             DISCONTINUED MEDICATIONS:  Discharge Medication List as of 11/27/2022 11:05 PM                 Pt was seen during the [place] 19 pandemic. Appropriate PPE worn by ME during patient encounters. Pt seen during a time with constrained hospital bed capacity and other potential inpatient and outpatient resources were constrained due to the viral pandemic.    Please note that portions of this note were completed with a voice recognition program.  Efforts were made to edit the dictations but occasionally words are mis-transcribed.)    George Betancur PA-C (electronically signed)        George Betancur PA-C  11/28/22 130 Hailey Flanagan PA-C  11/28/22 0056

## 2022-11-28 LAB
BETA-HYDROXYBUTYRATE: 0.67 MMOL/L (ref 0–0.27)
EKG ATRIAL RATE: 93 BPM
EKG DIAGNOSIS: NORMAL
EKG P AXIS: 23 DEGREES
EKG P-R INTERVAL: 116 MS
EKG Q-T INTERVAL: 338 MS
EKG QRS DURATION: 90 MS
EKG QTC CALCULATION (BAZETT): 420 MS
EKG R AXIS: -13 DEGREES
EKG T AXIS: 43 DEGREES
EKG VENTRICULAR RATE: 93 BPM

## 2022-11-28 PROCEDURE — 93010 ELECTROCARDIOGRAM REPORT: CPT | Performed by: INTERNAL MEDICINE

## 2022-12-01 LAB
BLOOD CULTURE, ROUTINE: NORMAL
CULTURE, BLOOD 2: NORMAL

## 2022-12-05 ENCOUNTER — OFFICE VISIT (OUTPATIENT)
Dept: FAMILY MEDICINE CLINIC | Age: 27
End: 2022-12-05
Payer: MEDICAID

## 2022-12-05 VITALS
BODY MASS INDEX: 23.14 KG/M2 | HEART RATE: 89 BPM | DIASTOLIC BLOOD PRESSURE: 86 MMHG | SYSTOLIC BLOOD PRESSURE: 120 MMHG | OXYGEN SATURATION: 98 % | WEIGHT: 175.4 LBS

## 2022-12-05 DIAGNOSIS — E03.9 ACQUIRED HYPOTHYROIDISM: ICD-10-CM

## 2022-12-05 DIAGNOSIS — E10.65 UNCONTROLLED TYPE 1 DIABETES MELLITUS WITH HYPERGLYCEMIA (HCC): ICD-10-CM

## 2022-12-05 DIAGNOSIS — S32.82XA MULTIPLE FRACTURES OF PELVIS WITHOUT DISRUPTION OF PELVIC RING, INITIAL ENCOUNTER FOR CLOSED FRACTURE (HCC): ICD-10-CM

## 2022-12-05 DIAGNOSIS — E10.49 OTHER DIABETIC NEUROLOGICAL COMPLICATION ASSOCIATED WITH TYPE 1 DIABETES MELLITUS (HCC): ICD-10-CM

## 2022-12-05 DIAGNOSIS — M86.171 ACUTE OSTEOMYELITIS OF RIGHT FOOT (HCC): ICD-10-CM

## 2022-12-05 DIAGNOSIS — Z76.89 ENCOUNTER TO ESTABLISH CARE: Primary | ICD-10-CM

## 2022-12-05 PROBLEM — E11.40 DIABETIC NEUROPATHY (HCC): Status: ACTIVE | Noted: 2017-09-26

## 2022-12-05 PROBLEM — E78.1 HYPERTRIGLYCERIDEMIA: Status: ACTIVE | Noted: 2020-05-27

## 2022-12-05 PROBLEM — M86.172: Status: ACTIVE | Noted: 2022-07-09

## 2022-12-05 PROBLEM — F41.8 MIXED ANXIETY AND DEPRESSIVE DISORDER: Status: ACTIVE | Noted: 2017-12-20

## 2022-12-05 PROBLEM — E87.20 ACIDOSIS, METABOLIC: Status: RESOLVED | Noted: 2022-09-29 | Resolved: 2022-12-05

## 2022-12-05 PROBLEM — E87.20 ACIDOSIS, METABOLIC: Status: ACTIVE | Noted: 2022-09-29

## 2022-12-05 PROBLEM — E10.9 TYPE 1 DIABETES MELLITUS WITHOUT COMPLICATION (HCC): Status: ACTIVE | Noted: 2020-08-08

## 2022-12-05 PROBLEM — L97.522 ULCER OF LEFT FOOT, WITH FAT LAYER EXPOSED (HCC): Status: ACTIVE | Noted: 2022-07-26

## 2022-12-05 PROCEDURE — 3046F HEMOGLOBIN A1C LEVEL >9.0%: CPT | Performed by: STUDENT IN AN ORGANIZED HEALTH CARE EDUCATION/TRAINING PROGRAM

## 2022-12-05 PROCEDURE — G8484 FLU IMMUNIZE NO ADMIN: HCPCS | Performed by: STUDENT IN AN ORGANIZED HEALTH CARE EDUCATION/TRAINING PROGRAM

## 2022-12-05 PROCEDURE — G8420 CALC BMI NORM PARAMETERS: HCPCS | Performed by: STUDENT IN AN ORGANIZED HEALTH CARE EDUCATION/TRAINING PROGRAM

## 2022-12-05 PROCEDURE — 99204 OFFICE O/P NEW MOD 45 MIN: CPT | Performed by: STUDENT IN AN ORGANIZED HEALTH CARE EDUCATION/TRAINING PROGRAM

## 2022-12-05 PROCEDURE — 2022F DILAT RTA XM EVC RTNOPTHY: CPT | Performed by: STUDENT IN AN ORGANIZED HEALTH CARE EDUCATION/TRAINING PROGRAM

## 2022-12-05 PROCEDURE — 1036F TOBACCO NON-USER: CPT | Performed by: STUDENT IN AN ORGANIZED HEALTH CARE EDUCATION/TRAINING PROGRAM

## 2022-12-05 PROCEDURE — G8427 DOCREV CUR MEDS BY ELIG CLIN: HCPCS | Performed by: STUDENT IN AN ORGANIZED HEALTH CARE EDUCATION/TRAINING PROGRAM

## 2022-12-05 RX ORDER — INSULIN GLARGINE 100 [IU]/ML
30 INJECTION, SOLUTION SUBCUTANEOUS NIGHTLY
Qty: 10 ADJUSTABLE DOSE PRE-FILLED PEN SYRINGE | Refills: 0 | Status: SHIPPED | OUTPATIENT
Start: 2022-12-05 | End: 2022-12-15

## 2022-12-05 RX ORDER — INSULIN LISPRO 100 [IU]/ML
10 INJECTION, SOLUTION INTRAVENOUS; SUBCUTANEOUS
Qty: 3 ML | Refills: 0 | Status: SHIPPED | OUTPATIENT
Start: 2022-12-05 | End: 2022-12-15

## 2022-12-05 RX ORDER — FLASH GLUCOSE SCANNING READER
1 EACH MISCELLANEOUS DAILY
Qty: 1 EACH | Refills: 0 | Status: SHIPPED | OUTPATIENT
Start: 2022-12-05

## 2022-12-05 RX ORDER — LEVOTHYROXINE SODIUM 0.05 MG/1
50 TABLET ORAL
Qty: 30 TABLET | Refills: 5 | Status: SHIPPED | OUTPATIENT
Start: 2022-12-05

## 2022-12-05 RX ORDER — LEVOTHYROXINE SODIUM 0.05 MG/1
50 TABLET ORAL
COMMUNITY
End: 2022-12-05 | Stop reason: SDUPTHER

## 2022-12-05 RX ORDER — FLASH GLUCOSE SENSOR
1 KIT MISCELLANEOUS
Qty: 2 EACH | Refills: 4 | Status: SHIPPED | OUTPATIENT
Start: 2022-12-05

## 2022-12-05 SDOH — ECONOMIC STABILITY: TRANSPORTATION INSECURITY
IN THE PAST 12 MONTHS, HAS THE LACK OF TRANSPORTATION KEPT YOU FROM MEDICAL APPOINTMENTS OR FROM GETTING MEDICATIONS?: YES

## 2022-12-05 SDOH — ECONOMIC STABILITY: HOUSING INSECURITY: IN THE LAST 12 MONTHS, HOW MANY PLACES HAVE YOU LIVED?: 1

## 2022-12-05 SDOH — ECONOMIC STABILITY: FOOD INSECURITY: WITHIN THE PAST 12 MONTHS, YOU WORRIED THAT YOUR FOOD WOULD RUN OUT BEFORE YOU GOT MONEY TO BUY MORE.: SOMETIMES TRUE

## 2022-12-05 SDOH — ECONOMIC STABILITY: HOUSING INSECURITY
IN THE LAST 12 MONTHS, WAS THERE A TIME WHEN YOU DID NOT HAVE A STEADY PLACE TO SLEEP OR SLEPT IN A SHELTER (INCLUDING NOW)?: NO

## 2022-12-05 SDOH — ECONOMIC STABILITY: FOOD INSECURITY: WITHIN THE PAST 12 MONTHS, THE FOOD YOU BOUGHT JUST DIDN'T LAST AND YOU DIDN'T HAVE MONEY TO GET MORE.: SOMETIMES TRUE

## 2022-12-05 SDOH — ECONOMIC STABILITY: INCOME INSECURITY: IN THE LAST 12 MONTHS, WAS THERE A TIME WHEN YOU WERE NOT ABLE TO PAY THE MORTGAGE OR RENT ON TIME?: NO

## 2022-12-05 SDOH — ECONOMIC STABILITY: TRANSPORTATION INSECURITY
IN THE PAST 12 MONTHS, HAS LACK OF TRANSPORTATION KEPT YOU FROM MEETINGS, WORK, OR FROM GETTING THINGS NEEDED FOR DAILY LIVING?: YES

## 2022-12-05 ASSESSMENT — SOCIAL DETERMINANTS OF HEALTH (SDOH): HOW HARD IS IT FOR YOU TO PAY FOR THE VERY BASICS LIKE FOOD, HOUSING, MEDICAL CARE, AND HEATING?: SOMEWHAT HARD

## 2022-12-05 ASSESSMENT — PATIENT HEALTH QUESTIONNAIRE - PHQ9
2. FEELING DOWN, DEPRESSED OR HOPELESS: 1
SUM OF ALL RESPONSES TO PHQ QUESTIONS 1-9: 2
1. LITTLE INTEREST OR PLEASURE IN DOING THINGS: 1
SUM OF ALL RESPONSES TO PHQ9 QUESTIONS 1 & 2: 2
SUM OF ALL RESPONSES TO PHQ QUESTIONS 1-9: 2

## 2022-12-05 ASSESSMENT — ENCOUNTER SYMPTOMS
CHEST TIGHTNESS: 0
BLOOD IN STOOL: 0
ABDOMINAL DISTENTION: 0
WHEEZING: 0
PHOTOPHOBIA: 0
SHORTNESS OF BREATH: 0

## 2022-12-05 NOTE — PROGRESS NOTES
Met with patient to discuss CGM. Patient educated on how to properly place sensor, discussed importance of rotating arms and how to prevent sensor from coming off. Reviewed with patient how to set up FreeStyle paul 2 reader, how to view settings, alarms and how to scan sensor using reader. Showed patient how to access [employer] at home and upload device to [employer] portal. Patient denied any questions at this time. Order for JAZMINE LORENZANA [facility] 2 sensors and reader sent to his pharmacy as well as a new script for smaller insulin pen needles with verbal OK from PCP. Patient will call office if he needs any further assistance with CGM.      Electronically signed by Leroy Lopez RN on 12/5/2022 at 9:59 AM
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
## 2022-12-20 ENCOUNTER — TELEPHONE (OUTPATIENT)
Dept: FAMILY MEDICINE CLINIC | Age: 27
End: 2022-12-20

## 2022-12-20 DIAGNOSIS — E10.65 UNCONTROLLED TYPE 1 DIABETES MELLITUS WITH HYPERGLYCEMIA (HCC): ICD-10-CM

## 2022-12-20 NOTE — TELEPHONE ENCOUNTER
Patient called back and He says that he hadn't picked in needles up so he should still have a standing script waiting for him at the pharmacy.

## 2022-12-20 NOTE — TELEPHONE ENCOUNTER
Patient called requesting that his prescription be changed due to receiving insulin in vial rather than pen. After speaking with Dr. Marilyn daigle and realizing that the prescription was for the prefilled pens; I called the pharmacy. After the pharmacist answered I told her the situation and she then said well I don't know what system you used to put the prescription in but it wasn't ordered as pens. She did go ahead and put the prescription in as a verbal script. Then told me that the prescription we are looking for is called Solostar lantus pens. So we got that issue resolved for the time being. I tried calling the patient back twice with  each time I did tell him to give me a call back here at the office. Reasoning for a call back is because patient says he needed to order the needles, however doctor sahara ordered those on 12/5/22. We need to see if he picked those up when he received the wrong insulin, or he has a script still at pharmacy for the needles. Possibly wrong needles ordered on 12/5/22 as well. Just want to clarify before send over a new request to Dr. Marilyn daigle. Detail Level: Detailed

## 2022-12-23 DIAGNOSIS — E10.65 UNCONTROLLED TYPE 1 DIABETES MELLITUS WITH HYPERGLYCEMIA (HCC): Primary | ICD-10-CM

## 2022-12-23 DIAGNOSIS — E10.65 UNCONTROLLED TYPE 1 DIABETES MELLITUS WITH HYPERGLYCEMIA (HCC): ICD-10-CM

## 2022-12-23 RX ORDER — INSULIN LISPRO 100 [IU]/ML
10 INJECTION, SOLUTION INTRAVENOUS; SUBCUTANEOUS
Qty: 9 ML | Refills: 1 | Status: SHIPPED | OUTPATIENT
Start: 2022-12-23 | End: 2023-02-21

## 2022-12-23 RX ORDER — INSULIN GLARGINE 100 [IU]/ML
30 INJECTION, SOLUTION SUBCUTANEOUS NIGHTLY
Qty: 10 ADJUSTABLE DOSE PRE-FILLED PEN SYRINGE | Refills: 0 | Status: CANCELLED | OUTPATIENT
Start: 2022-12-23 | End: 2023-01-02

## 2022-12-23 RX ORDER — INSULIN LISPRO 100 [IU]/ML
10 INJECTION, SOLUTION INTRAVENOUS; SUBCUTANEOUS
Qty: 3 ML | Refills: 0 | Status: CANCELLED | OUTPATIENT
Start: 2022-12-23 | End: 2023-01-02

## 2022-12-23 RX ORDER — INSULIN GLARGINE 100 [IU]/ML
30 INJECTION, SOLUTION SUBCUTANEOUS NIGHTLY
Qty: 5 ADJUSTABLE DOSE PRE-FILLED PEN SYRINGE | Refills: 3 | Status: SHIPPED | OUTPATIENT
Start: 2022-12-23

## 2022-12-23 NOTE — TELEPHONE ENCOUNTER
Called patient about a previous problem to make sure things got resolved with his medication and needles. He said that they had only filled a single pen. Im going to pend a couple prescriptions. The lantus is not correct so please change in to the lantus solostar.

## 2022-12-23 NOTE — TELEPHONE ENCOUNTER
He already has needles I did the verbal that day when I spoke with them regarding the original Prescription

## 2023-03-02 ENCOUNTER — HOSPITAL ENCOUNTER (EMERGENCY)
Age: 28
Discharge: HOME | End: 2023-03-02
Payer: MEDICAID

## 2023-03-02 ENCOUNTER — APPOINTMENT (OUTPATIENT)
Dept: GENERAL RADIOLOGY | Age: 28
End: 2023-03-02
Payer: MEDICAID

## 2023-03-02 VITALS
TEMPERATURE: 98.4 F | SYSTOLIC BLOOD PRESSURE: 96 MMHG | HEART RATE: 85 BPM | WEIGHT: 171 LBS | BODY MASS INDEX: 22.66 KG/M2 | OXYGEN SATURATION: 96 % | RESPIRATION RATE: 16 BRPM | HEIGHT: 73 IN | DIASTOLIC BLOOD PRESSURE: 68 MMHG

## 2023-03-02 DIAGNOSIS — E10.628 TYPE 1 DIABETES MELLITUS WITH OTHER SKIN COMPLICATION (HCC): ICD-10-CM

## 2023-03-02 DIAGNOSIS — L02.611 FOOT ABSCESS, RIGHT: Primary | ICD-10-CM

## 2023-03-02 LAB
ANION GAP SERPL CALCULATED.3IONS-SCNC: 8 MMOL/L (ref 3–16)
BASOPHILS ABSOLUTE: 0 K/UL (ref 0–0.2)
BASOPHILS RELATIVE PERCENT: 0.5 %
BUN BLDV-MCNC: 22 MG/DL (ref 7–20)
CALCIUM SERPL-MCNC: 9.9 MG/DL (ref 8.3–10.6)
CHLORIDE BLD-SCNC: 102 MMOL/L (ref 99–110)
CO2: 28 MMOL/L (ref 21–32)
CREAT SERPL-MCNC: 1.2 MG/DL (ref 0.9–1.3)
EOSINOPHILS ABSOLUTE: 0.2 K/UL (ref 0–0.6)
EOSINOPHILS RELATIVE PERCENT: 2 %
GFR SERPL CREATININE-BSD FRML MDRD: >60 ML/MIN/{1.73_M2}
GLUCOSE BLD-MCNC: 251 MG/DL (ref 70–99)
HCT VFR BLD CALC: 37.7 % (ref 40.5–52.5)
HEMOGLOBIN: 12.9 G/DL (ref 13.5–17.5)
LYMPHOCYTES ABSOLUTE: 2.4 K/UL (ref 1–5.1)
LYMPHOCYTES RELATIVE PERCENT: 26.9 %
MCH RBC QN AUTO: 30 PG (ref 26–34)
MCHC RBC AUTO-ENTMCNC: 34.3 G/DL (ref 31–36)
MCV RBC AUTO: 87.5 FL (ref 80–100)
MONOCYTES ABSOLUTE: 0.8 K/UL (ref 0–1.3)
MONOCYTES RELATIVE PERCENT: 8.8 %
NEUTROPHILS ABSOLUTE: 5.5 K/UL (ref 1.7–7.7)
NEUTROPHILS RELATIVE PERCENT: 61.8 %
PDW BLD-RTO: 12.7 % (ref 12.4–15.4)
PLATELET # BLD: 223 K/UL (ref 135–450)
PMV BLD AUTO: 8.1 FL (ref 5–10.5)
POTASSIUM REFLEX MAGNESIUM: 4.7 MMOL/L (ref 3.5–5.1)
RBC # BLD: 4.31 M/UL (ref 4.2–5.9)
SODIUM BLD-SCNC: 138 MMOL/L (ref 136–145)
WBC # BLD: 8.9 K/UL (ref 4–11)

## 2023-03-02 PROCEDURE — 80048 BASIC METABOLIC PNL TOTAL CA: CPT

## 2023-03-02 PROCEDURE — 85025 COMPLETE CBC W/AUTO DIFF WBC: CPT

## 2023-03-02 PROCEDURE — 99284 EMERGENCY DEPT VISIT MOD MDM: CPT

## 2023-03-02 PROCEDURE — 36415 COLL VENOUS BLD VENIPUNCTURE: CPT

## 2023-03-02 PROCEDURE — 73630 X-RAY EXAM OF FOOT: CPT

## 2023-03-02 PROCEDURE — 83036 HEMOGLOBIN GLYCOSYLATED A1C: CPT

## 2023-03-02 RX ORDER — AMOXICILLIN AND CLAVULANATE POTASSIUM 875; 125 MG/1; MG/1
1 TABLET, FILM COATED ORAL 2 TIMES DAILY
Qty: 20 TABLET | Refills: 0 | Status: SHIPPED | OUTPATIENT
Start: 2023-03-02 | End: 2023-03-12

## 2023-03-02 RX ORDER — DOXYCYCLINE HYCLATE 100 MG
100 TABLET ORAL 2 TIMES DAILY
Qty: 20 TABLET | Refills: 0 | Status: SHIPPED | OUTPATIENT
Start: 2023-03-02 | End: 2023-03-12

## 2023-03-02 ASSESSMENT — PAIN - FUNCTIONAL ASSESSMENT
PAIN_FUNCTIONAL_ASSESSMENT: NONE - DENIES PAIN
PAIN_FUNCTIONAL_ASSESSMENT: NONE - DENIES PAIN

## 2023-03-02 ASSESSMENT — PAIN SCALES - GENERAL: PAINLEVEL_OUTOF10: 0

## 2023-03-02 ASSESSMENT — PAIN DESCRIPTION - PAIN TYPE: TYPE: ACUTE PAIN

## 2023-03-02 NOTE — ED PROVIDER NOTES
[facility]  ED  EMERGENCY DEPARTMENT ENCOUNTER        Pt Name: Joleen Bardales  MRN: [id number]  [place] 1995  Date of evaluation: 3/2/2023  Provider: Nidia Barragan PA-C  PCP: Carlos Salinas MD  Note Started: 4:41 PM EST 3/2/23      LOUIE. I have evaluated this patient. My supervising physician was available for consultation. CHIEF COMPLAINT       Chief Complaint   Patient presents with    Foot Injury     Pt has a blister on his right foot, he noticed it Tuesday. Pt is diabetic. HISTORY OF PRESENT ILLNESS: 1 or more Elements     History From: Patient    Limitations to history : None    Joleen Bardales is a 33 y.o. male who presents to the emergency department with complaint early formation of blister or superficial abscess right foot heel. Onset 48 hours ago with noticed by patient yesterday. States a bit sore. No systemic complaint such as fevers, chills or other related symptoms. Patient is diabetic. States blood sugar runs less than 300. States last A1c 13.9 he thinks about 6 months ago but is not certain. He does not indicate any obvious foreign body situation. The patient does report that his tetanus shot was updated last year with [place]. He states he has had similar in the past and if he gets antibiotics early it may improve his endpoint outcome. Nursing Notes were all reviewed and agreed with or any disagreements were addressed in the HPI. REVIEW OF SYSTEMS :      Review of Systems    Positives and Pertinent negatives as per HPI.      SURGICAL HISTORY     Past Surgical History:   Procedure Laterality Date    FRENULECTOMY, LINGUAL      when 13 yo    MULTIPLE TOOTH EXTRACTIONS      TOE AMPUTATION      pinky toe from diabeti ulcer       CURRENTMEDICATIONS       Discharge Medication List as of 3/2/2023  5:59 PM        CONTINUE these medications which have NOT CHANGED    Details   insulin lispro, 1 Unit Dial, (HUMALOG KWIKPEN) 100 UNIT/ML SOPN Inject 10 Units into the skin 3 times daily (before meals) 10 :1 scale for carbs, Disp-9 mL, R-1Normal      insulin glargine (LANTUS SOLOSTAR) 100 UNIT/ML injection pen Inject 30 Units into the skin nightly, Disp-5 Adjustable Dose Pre-filled Pen Syringe, R-3Normal      Glucose Blood (COOL BLOOD GLUCOSE TEST STRIPS VI) Blood Glucose Test strips   Use 1 test strip to check glucose 4 times daily. Diag E10.65. Historical Med      insulin glargine (LANTUS) 100 UNIT/ML injection vial Inject 30 Units into the skin nightly for 10 days 34 units, Disp-10 Adjustable Dose Pre-filled Pen Syringe, R-0Normal      levothyroxine (SYNTHROID) 50 MCG tablet Take 1 tablet by mouth every morning (before breakfast), Disp-30 tablet, R-5Normal      Continuous Blood Gluc Sensor (FREESTYLE ALLAN 2 SENSOR) MISC 1 Device by Does not apply route every 14 days, Disp-2 each, R-4Normal      Continuous Blood Gluc  (FREESTYLE ALLAN 2 READER) CARISSA 1 Device by Does not apply route daily, Disp-1 each, R-0Normal      Insulin Pen Needle 32G X 5 MM MISC DAILY Starting Mon 2022, Disp-100 each, R-3, Normal             ALLERGIES     Sulfa antibiotics    FAMILYHISTORY       Family History   Problem Relation Age of Onset    Hypertension Mother     Hypothyroidism Mother     Cancer Sister         in remission from stage 3, not sure what    Breast Cancer Paternal Grandmother     Diabetes Type 1  Other         uncle on moms side        SOCIAL HISTORY       Social History     Tobacco Use    Smoking status: Former     Packs/day: 1.00     Types: Cigarettes     Start date: 2014     Quit date: 2022     Years since quittin.7    Smokeless tobacco: Never   Vaping Use    Vaping Use: Never used   Substance Use Topics    Alcohol use: Not Currently    Drug use: Yes     Types: Marijuana (Weed)       SCREENINGS        Isabel Coma Scale  Eye Opening: Spontaneous  Best Verbal Response: Oriented  Best Motor Response: Obeys commands  Isabel Coma Scale Score: 15 WA Assessment  BP: 96/68  Heart Rate: 85           PHYSICAL EXAM  1 or more Elements     ED Triage Vitals [03/02/23 1603]   BP Temp Temp Source Heart Rate Resp SpO2 Height Weight   109/73 98.5 °F (36.9 °C) Oral 82 18 96 % -- --       Physical Exam  Vitals and nursing note reviewed. Constitutional:       Appearance: Normal appearance. He is well-developed and normal weight. HENT:      Head: Normocephalic and atraumatic. Right Ear: External ear normal.      Left Ear: External ear normal.   Eyes:      General: No scleral icterus. Right eye: No discharge. Left eye: No discharge. Conjunctiva/sclera: Conjunctivae normal.   Cardiovascular:      Rate and Rhythm: Normal rate and regular rhythm. Heart sounds: Normal heart sounds. Pulmonary:      Effort: Pulmonary effort is normal.      Breath sounds: Normal breath sounds. Abdominal:      General: Abdomen is flat. Bowel sounds are normal.      Palpations: Abdomen is soft. Tenderness: There is no abdominal tenderness. Musculoskeletal:         General: Tenderness present. Normal range of motion. Cervical back: Normal range of motion and neck supple. Right lower leg: No edema. Left lower leg: No edema. Comments: He denies any groin tenderness. Plantar aspect and posterior right heel reveals a 3 cm diameter circular slightly oval shaped lifting of the epidermal structure with some fluctuance beneath. I do not see any surrounding erythema there is a few red streaks uncertain if this relates specifically to this or the shoes that he typically wears. He does not wear boots. This is not likely to be a friction though cannot determine with clarity. Skin:     General: Skin is warm and dry. Neurological:      General: No focal deficit present. Mental Status: He is alert and oriented to person, place, and time. Mental status is at baseline.    Psychiatric:         Mood and Affect: Mood normal. Behavior: Behavior normal.         Thought Content: Thought content normal.         Judgment: Judgment normal.       DIAGNOSTIC RESULTS   LABS:    Labs Reviewed   BASIC METABOLIC PANEL W/ REFLEX TO MG FOR LOW K - Abnormal; Notable for the following components:       Result Value    Glucose 251 (*)     BUN 22 (*)     All other components within normal limits   CBC WITH AUTO DIFFERENTIAL - Abnormal; Notable for the following components:    Hemoglobin 12.9 (*)     Hematocrit 37.7 (*)     All other components within normal limits   HEMOGLOBIN A1C       When ordered only abnormal lab results are displayed. All other labs were within normal range or not returned as of this dictation. EKG: When ordered, EKG's are interpreted by the Emergency Department Physician in the absence of a cardiologist.  Please see their note for interpretation of EKG. RADIOLOGY:   Non-plain film images such as CT, Ultrasound and MRI are read by the radiologist. Plain radiographic images are visualized and preliminarily interpreted by the ED Provider with the below findings:    X-ray of the patient's right foot as viewed by myself and interpreted by the radiologist shows no obvious radiopaque foreign body, gas in tissue or osteomyelitis involving bony structures    Interpretation per the Radiologist below, if available at the time of this note:    XR FOOT RIGHT (MIN 3 VIEWS)   Final Result   No evidence of osteomyelitis or radiopaque foreign bodies           No results found. No results found. PROCEDURES   Unless otherwise noted below, none     Procedures    CRITICAL CARE TIME (.cctime)       PAST MEDICAL HISTORY      has a past medical history of Acidosis, metabolic (2/19/9433), Diabetes mellitus ([facility] [address] and Thyroid disease.      EMERGENCY DEPARTMENT COURSE and DIFFERENTIAL DIAGNOSIS/MDM:   Vitals:    Vitals:    03/02/23 1603 03/02/23 1803 03/02/23 1810   BP: 109/73 96/68    Pulse: 82 85    Resp: 18 16    Temp: 98.5 °F (36.9 °C) 98.4 °F (36.9 °C)    TempSrc: Oral Oral    SpO2: 96% 96%    Weight:   171 lb (77.6 kg)   Height:   6' 1\" (1.854 m)       Patient was given the following medications:  Medications - No data to display          Is this patient to be included in the SEP-1 Core Measure due to severe sepsis or septic shock? No   Exclusion criteria - the patient is NOT to be included for SEP-1 Core Measure due to: Infection is not suspected    Chronic Conditions affecting care: Diabetes, history osteomyelitis, history of diabetic foot ulcer   has a past medical history of Acidosis, metabolic (7/22/2063), Diabetes mellitus ([facility] [address] and Thyroid disease. CONSULTS: (Who and What was discussed)  None    Social Determinants Significantly Affecting Health : None    Records Reviewed (External and Source) none    CC/HPI Summary, DDx, ED Course, and Reassessment: This patient presenting with emerging diabetic foot infection with what appears to be superficial abscess plantar aspect and heel right foot. He has had this previously. He comes in to get antibiotic. He is not septic. Blood sugar 251. X-ray not showing evidence of foreign body or evidence of osteomyelitis. No drainage. Skin intact. I did not want to debride as this might create additional situation. I have referred to podiatry for him to be seen tomorrow. I will start doxycycline and Augmentin. The patient has expressed understanding of his diagnosis and his treatment plan. Disposition Considerations (tests considered but not done, Admit vs D/C, Shared Decision Making, Pt Expectation of Test or Tx.):     Patient will be discharged to home with diagnosis superficial early stage abscess plantar and heel posteriorly on the right foot. X-ray negative for foreign body, gas and tissue or osteomyelitis. WBC not elevated. Blood sugar 251. We will start Augmentin and doxycycline. I discussed case with the pharmacist as he does have history of MRSA osteomyelitis left foot.   Patient is instructed to contact podiatry tomorrow to be seen by any podiatrist in the office tomorrow. He is expressed agreement and understanding. I am the Primary Clinician of Record. FINAL IMPRESSION      1. Foot abscess, right    2. Type 1 diabetes mellitus with other skin complication [facility])          DISPOSITION/PLAN     DISPOSITION Decision To Discharge 03/02/2023 05:55:11 PM      PATIENT REFERRED TO:  Hermann Kelly, GUY  [address] [place] [address] [place]  [phone]    Schedule an appointment as soon as possible for a visit in 1 day      Garry Ramos MD  [address] [facility]  [address] [place] [address] [place] [address]  [phone]    Schedule an appointment as soon as possible for a visit on 3/6/2023      [facility]  ED  [address]  [address] [place] [address] [place] [address]  Go to   If symptoms worsen    DISCHARGE MEDICATIONS:  Discharge Medication List as of 3/2/2023  5:59 PM        START taking these medications    Details   doxycycline hyclate (VIBRA-TABS) 100 MG tablet Take 1 tablet by mouth 2 times daily for 10 days, Disp-20 tablet, R-0Normal      amoxicillin-clavulanate (AUGMENTIN) 875-125 MG per tablet Take 1 tablet by mouth 2 times daily for 10 days, Disp-20 tablet, R-0Normal             DISCONTINUED MEDICATIONS:  Discharge Medication List as of 3/2/2023  5:59 PM                 (Please note that portions of this note were completed with a voice recognition program.  Efforts were made to edit the dictations but occasionally words are mis-transcribed. )    Megan Murdock PA-C (electronically signed)        Megan Murdock PA-C  03/02/23 1922

## 2023-03-02 NOTE — DISCHARGE INSTRUCTIONS
This appears to be an emerging superficial skin abscess on the heel of the right foot.  X-ray not showing osteomyelitis or foreign body in the skin.  I do want you to maintain soap and water cleansing 2-3 times daily.  Topical antibiotic ointment will not benefit.  Take the antibiotics as prescribed.  Tomorrow morning contact Dr. Shabana Doshi office to be seen by her or any podiatrist in that office tomorrow.  You may also decide to see podiatry of your choice.  I would like you seen tomorrow as we will emergent to the weekend.  Ibuprofen or Tylenol for pain control.  Your blood sugar today is 251.  Your WBC 8.9.  Your hemoglobin A1c pending at this time.

## 2023-03-03 LAB
ESTIMATED AVERAGE GLUCOSE: 194.4 MG/DL
HBA1C MFR BLD: 8.4 %

## 2023-03-06 DIAGNOSIS — E10.65 UNCONTROLLED TYPE 1 DIABETES MELLITUS WITH HYPERGLYCEMIA (HCC): ICD-10-CM

## 2023-03-06 NOTE — TELEPHONE ENCOUNTER
Refill Request     CONFIRM preferrred pharmacy with the patient. If Mail Order Rx - Pend for 90 day refill. Last Seen: Last Seen Department: 12/5/2022  Last Seen by PCP: 12/5/2022    Last Written: 12/23/2022, #9mL, 1 refill    If no future appointment scheduled, route STAFF MESSAGE with patient name to the [facility] [address] for scheduling. Next Appointment:   No future appointments. Message sent to [address] [place] [address] to schedule appt with patient?   YES      Requested Prescriptions     Pending Prescriptions Disp Refills    insulin lispro, 1 Unit Dial, (HUMALOG/ADMELOG) 100 UNIT/ML SOPN [Pharmacy Med Name: INSULIN LISPRO 100 UNIT/ML PEN] 9 mL 1     Sig: INJECT 10 UNITS INTO THE SKIN THREE TIMES A DAY BEFORE MEALS 10:1 SCALE FOR CARBS

## 2023-03-07 RX ORDER — INSULIN LISPRO 100 [IU]/ML
INJECTION, SOLUTION INTRAVENOUS; SUBCUTANEOUS
Qty: 9 ML | Refills: 1 | Status: SHIPPED | OUTPATIENT
Start: 2023-03-07

## 2023-04-09 PROBLEM — Z87.891 FORMER SMOKER: Status: ACTIVE | Noted: 2023-04-09

## 2023-04-09 PROBLEM — F12.90 MARIJUANA USE: Status: ACTIVE | Noted: 2023-04-09

## 2023-04-09 PROBLEM — D72.825 BANDEMIA: Status: ACTIVE | Noted: 2023-04-09

## 2023-04-09 PROBLEM — E87.5 HYPERKALEMIA: Status: ACTIVE | Noted: 2023-04-09

## 2023-04-09 PROBLEM — E10.10 DKA, TYPE 1, NOT AT GOAL (HCC): Status: ACTIVE | Noted: 2023-04-09

## 2023-04-09 PROBLEM — E87.1 HYPONATREMIA: Status: ACTIVE | Noted: 2023-04-09

## 2023-04-09 PROBLEM — N17.9 AKI (ACUTE KIDNEY INJURY) (HCC): Status: ACTIVE | Noted: 2023-04-09

## 2023-04-21 ENCOUNTER — OFFICE VISIT (OUTPATIENT)
Dept: FAMILY MEDICINE CLINIC | Age: 28
End: 2023-04-21

## 2023-04-21 ENCOUNTER — TELEPHONE (OUTPATIENT)
Dept: FAMILY MEDICINE CLINIC | Age: 28
End: 2023-04-21

## 2023-04-21 VITALS
SYSTOLIC BLOOD PRESSURE: 120 MMHG | OXYGEN SATURATION: 97 % | BODY MASS INDEX: 23.19 KG/M2 | DIASTOLIC BLOOD PRESSURE: 74 MMHG | HEART RATE: 84 BPM | WEIGHT: 171 LBS

## 2023-04-21 DIAGNOSIS — Z09 HOSPITAL DISCHARGE FOLLOW-UP: Primary | ICD-10-CM

## 2023-04-21 DIAGNOSIS — E10.65 UNCONTROLLED TYPE 1 DIABETES MELLITUS WITH HYPERGLYCEMIA (HCC): ICD-10-CM

## 2023-04-21 DIAGNOSIS — S98.132A AMPUTATION OF TOE OF LEFT FOOT (HCC): ICD-10-CM

## 2023-04-21 DIAGNOSIS — S91.301A: ICD-10-CM

## 2023-04-21 DIAGNOSIS — E10.49 OTHER DIABETIC NEUROLOGICAL COMPLICATION ASSOCIATED WITH TYPE 1 DIABETES MELLITUS (HCC): ICD-10-CM

## 2023-04-21 DIAGNOSIS — L97.522 ULCER OF LEFT FOOT, WITH FAT LAYER EXPOSED (HCC): ICD-10-CM

## 2023-04-21 DIAGNOSIS — E10.10 DKA, TYPE 1, NOT AT GOAL (HCC): ICD-10-CM

## 2023-04-21 RX ORDER — INSULIN ASPART 100 [IU]/ML
INJECTION, SOLUTION INTRAVENOUS; SUBCUTANEOUS
Qty: 5 ADJUSTABLE DOSE PRE-FILLED PEN SYRINGE | Refills: 3 | Status: SHIPPED | OUTPATIENT
Start: 2023-04-21 | End: 2023-04-21

## 2023-04-21 RX ORDER — INSULIN ASPART 100 [IU]/ML
INJECTION, SOLUTION INTRAVENOUS; SUBCUTANEOUS
Qty: 5 ADJUSTABLE DOSE PRE-FILLED PEN SYRINGE | Refills: 3 | Status: SHIPPED | OUTPATIENT
Start: 2023-04-21

## 2023-04-21 ASSESSMENT — PATIENT HEALTH QUESTIONNAIRE - PHQ9
3. TROUBLE FALLING OR STAYING ASLEEP: 0
7. TROUBLE CONCENTRATING ON THINGS, SUCH AS READING THE NEWSPAPER OR WATCHING TELEVISION: 0
SUM OF ALL RESPONSES TO PHQ QUESTIONS 1-9: 5
SUM OF ALL RESPONSES TO PHQ9 QUESTIONS 1 & 2: 2
1. LITTLE INTEREST OR PLEASURE IN DOING THINGS: 1
8. MOVING OR SPEAKING SO SLOWLY THAT OTHER PEOPLE COULD HAVE NOTICED. OR THE OPPOSITE, BEING SO FIGETY OR RESTLESS THAT YOU HAVE BEEN MOVING AROUND A LOT MORE THAN USUAL: 0
10. IF YOU CHECKED OFF ANY PROBLEMS, HOW DIFFICULT HAVE THESE PROBLEMS MADE IT FOR YOU TO DO YOUR WORK, TAKE CARE OF THINGS AT HOME, OR GET ALONG WITH OTHER PEOPLE: 1
SUM OF ALL RESPONSES TO PHQ QUESTIONS 1-9: 5
9. THOUGHTS THAT YOU WOULD BE BETTER OFF DEAD, OR OF HURTING YOURSELF: 0
6. FEELING BAD ABOUT YOURSELF - OR THAT YOU ARE A FAILURE OR HAVE LET YOURSELF OR YOUR FAMILY DOWN: 1
4. FEELING TIRED OR HAVING LITTLE ENERGY: 1
SUM OF ALL RESPONSES TO PHQ QUESTIONS 1-9: 5
2. FEELING DOWN, DEPRESSED OR HOPELESS: 1
SUM OF ALL RESPONSES TO PHQ QUESTIONS 1-9: 5
5. POOR APPETITE OR OVEREATING: 1

## 2023-04-21 NOTE — TELEPHONE ENCOUNTER
[address] [facility] called regarding the Novolog. They need a MAX number of daily units on the script in order to bill insurance. Thanks Dr. Aracelis Almaraz!

## 2023-04-21 NOTE — PROGRESS NOTES
Post-Discharge Transitional Care Follow Up      Chai Agrawal   YOB: 1995    Date of Office Visit:  4/21/2023  Date of Hospital Admission: 4/9/23  Date of Hospital Discharge: 4/10/23  Readmission Risk Score (high >=14%. Medium >=10%):Readmission Risk Score: 10.6      Care management risk score Rising risk (score 2-5) and Complex Care (Scores >=6): No Risk Score On File     Non face to face  following discharge, date last encounter closed (first attempt may have been earlier): 04/11/2023     Call initiated 2 business days of discharge: Yes     Hospital discharge follow-up  -     [place] DISCHARGE MEDS RECONCILED W/ CURRENT OUTPATIENT MED LIST  - no longer in DKA, asymptomatic, discussed importance of taking insulin every day and need to call our office THAT DAY if he loses his sensor. 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 (HCC)  -     insulin aspart (NOVOLOG FLEXPEN) 100 UNIT/ML injection pen; Give three times daily before meals according to 10:1 carb ratio., Disp-5 Adjustable Dose Pre-filled Pen Syringe, R-3Normal  -     POCT microalbumin  -     Diabetic Foot Exam  -     Jessica Pittman, DPM, Podiatry, [facility]  -     LIPID PANEL;  Future  DKA, type 1, not at goal ([address]  -     insulin aspart (NOVOLOG FLEXPEN) 100 UNIT/ML injection pen; Give three times daily before meals according to 10:1 carb ratio., Disp-5 Adjustable Dose Pre-filled Pen Syringe, R-3Normal  Open wound of foot with complication, right, initial encounter  -     [facility] and

## 2023-04-22 LAB
CHOLEST SERPL-MCNC: 117 MG/DL (ref 0–199)
HDLC SERPL-MCNC: 37 MG/DL (ref 40–60)
LDLC SERPL CALC-MCNC: 61 MG/DL
TRIGL SERPL-MCNC: 94 MG/DL (ref 0–150)
VLDLC SERPL CALC-MCNC: 19 MG/DL

## 2023-05-08 DIAGNOSIS — E10.65 UNCONTROLLED TYPE 1 DIABETES MELLITUS WITH HYPERGLYCEMIA (HCC): ICD-10-CM

## 2023-05-08 NOTE — TELEPHONE ENCOUNTER
Refill Request     CONFIRM preferred pharmacy with the patient. If Mail Order Rx - Pend for 90 day refill. Last Seen: Last Seen Department: 4/21/2023  Last Seen by PCP: 4/21/2023    Last Written: 12/5/2022    If no future appointment scheduled:  Review the last OV with PCP and review information for follow-up visit,  Route STAFF MESSAGE with patient name to the [facility] for scheduling with the following information:            -  Timing of next visit           -  Visit type ie Physical, OV, etc           -  Diagnoses/Reason ie. COPD, HTN - Do not use MEDICATION, Follow-up or CHECK UP - Give reason for visit      Next Appointment:   Future Appointments   Date Time Provider Jadiel Knapp   5/18/2023  2:30 PM MD Fabiola Cerda [facility]   7/21/2023  3:00 PM [facility]       Message sent to [facility] to schedule appt with patient?   NO      Requested Prescriptions     Pending Prescriptions Disp Refills    Continuous Blood Gluc Sensor (FREESTYLE ALLAN 2 SENSOR) MISC [Pharmacy Med Name: FREESTYLE ALLAN 2 SENSOR]       Sig: USE AS DIRECTED FOR 14 DAYS

## 2023-05-17 PROBLEM — Z87.891 FORMER SMOKER: Status: RESOLVED | Noted: 2023-04-09 | Resolved: 2023-05-17

## 2023-05-17 PROBLEM — E87.1 HYPONATREMIA: Status: RESOLVED | Noted: 2023-04-09 | Resolved: 2023-05-17

## 2023-05-17 PROBLEM — S32.82XA MULTIPLE FRACTURES OF PELVIS WITHOUT DISRUPTION OF PELVIC RING, INITIAL ENCOUNTER FOR CLOSED FRACTURE (HCC): Status: RESOLVED | Noted: 2020-07-31 | Resolved: 2023-05-17

## 2023-05-17 PROBLEM — E87.5 HYPERKALEMIA: Status: RESOLVED | Noted: 2023-04-09 | Resolved: 2023-05-17

## 2023-05-17 PROBLEM — L97.522 ULCER OF LEFT FOOT, WITH FAT LAYER EXPOSED (HCC): Status: RESOLVED | Noted: 2022-07-26 | Resolved: 2023-05-17

## 2023-05-17 PROBLEM — S62.347A CLOSED NONDISPLACED FRACTURE OF BASE OF FIFTH METACARPAL BONE OF LEFT HAND: Status: RESOLVED | Noted: 2021-12-23 | Resolved: 2023-05-17

## 2023-05-17 PROBLEM — D72.825 BANDEMIA: Status: RESOLVED | Noted: 2023-04-09 | Resolved: 2023-05-17

## 2023-05-18 ENCOUNTER — HOSPITAL ENCOUNTER (OUTPATIENT)
Dept: WOUND CARE | Age: 28
Discharge: HOME | End: 2023-05-18

## 2023-06-01 DIAGNOSIS — E10.10 DKA, TYPE 1, NOT AT GOAL (HCC): ICD-10-CM

## 2023-06-01 DIAGNOSIS — E10.49 OTHER DIABETIC NEUROLOGICAL COMPLICATION ASSOCIATED WITH TYPE 1 DIABETES MELLITUS (HCC): ICD-10-CM

## 2023-06-01 DIAGNOSIS — E10.65 UNCONTROLLED TYPE 1 DIABETES MELLITUS WITH HYPERGLYCEMIA (HCC): ICD-10-CM

## 2023-06-01 DIAGNOSIS — E03.9 ACQUIRED HYPOTHYROIDISM: ICD-10-CM

## 2023-06-01 RX ORDER — INSULIN ASPART 100 [IU]/ML
INJECTION, SOLUTION INTRAVENOUS; SUBCUTANEOUS
Qty: 5 ADJUSTABLE DOSE PRE-FILLED PEN SYRINGE | Refills: 3 | Status: SHIPPED | OUTPATIENT
Start: 2023-06-01

## 2023-06-01 RX ORDER — LEVOTHYROXINE SODIUM 0.05 MG/1
50 TABLET ORAL
Qty: 30 TABLET | Refills: 5 | Status: SHIPPED | OUTPATIENT
Start: 2023-06-01

## 2023-06-01 RX ORDER — INSULIN GLARGINE 100 [IU]/ML
30 INJECTION, SOLUTION SUBCUTANEOUS NIGHTLY
Qty: 5 ADJUSTABLE DOSE PRE-FILLED PEN SYRINGE | Refills: 3 | Status: SHIPPED | OUTPATIENT
Start: 2023-06-01

## 2023-06-01 NOTE — TELEPHONE ENCOUNTER
Refill Request     CONFIRM preferred pharmacy with the patient. If Mail Order Rx - Pend for 90 day refill. Last Seen: Last Seen Department: 4/21/2023  Last Seen by PCP: 4/21/2023    Last Written: 5/8/23 Disp 2 0 refills     If no future appointment scheduled:  Review the last OV with PCP and review information for follow-up visit,  Route STAFF MESSAGE with patient name to the [facility] for scheduling with the following information:            -  Timing of next visit           -  Visit type ie Physical, OV, etc           -  Diagnoses/Reason ie. COPD, HTN - Do not use MEDICATION, Follow-up or CHECK UP - Give reason for visit      Next Appointment:   Future Appointments   Date Time Provider Jadiel Knapp   7/21/2023  3:00 PM Urbano Maria MD [facility] - [facility]       Message sent to [address] [place] [address] to schedule appt with patient?   NO      Requested Prescriptions     Pending Prescriptions Disp Refills    Continuous Blood Gluc Sensor (FREESTYLE ALLAN 2 SENSOR) MISC 2 each 0     Sig: USE AS DIRECTED FOR 14 DAYS

## 2023-06-30 DIAGNOSIS — E10.65 UNCONTROLLED TYPE 1 DIABETES MELLITUS WITH HYPERGLYCEMIA (HCC): ICD-10-CM

## 2023-08-01 DIAGNOSIS — E10.65 UNCONTROLLED TYPE 1 DIABETES MELLITUS WITH HYPERGLYCEMIA (HCC): ICD-10-CM

## 2023-08-01 NOTE — TELEPHONE ENCOUNTER
Refill Request     CONFIRM preferred pharmacy with the patient. If Mail Order Rx - Pend for 90 day refill. Last Seen: Last Seen Department: 4/21/2023  Last Seen by PCP: 4/21/2023    Last Written: 07/03/2023 2 each 0 refills     If no future appointment scheduled:  Review the last OV with PCP and review information for follow-up visit,  Route STAFF MESSAGE with patient name to the [facility] for scheduling with the following information:            -  Timing of next visit           -  Visit type ie Physical, OV, etc           -  Diagnoses/Reason ie. COPD, HTN - Do not use MEDICATION, Follow-up or CHECK UP - Give reason for visit      Next Appointment:   Future Appointments   Date Time Provider [facility] [address]   9/7/2023  2:30 PM Guadalupe Vega MD [address] [facility]       Message sent to [address] [place] [address] to schedule appt with patient?   NO      Requested Prescriptions     Pending Prescriptions Disp Refills    Continuous Blood Gluc Sensor (FREESTYLE ALLAN 2 SENSOR) MISC [Pharmacy Med Name: FREESTYLE ALLAN 2 SENSOR]       Sig: USE AS DIRECTED TO CHECK BLOOD SUGAR AND CHANGE OUT ONCE EVERY 14 DAYS

## 2023-08-30 ENCOUNTER — TELEPHONE (OUTPATIENT)
Dept: PRIMARY CARE CLINIC | Age: 28
End: 2023-08-30

## 2023-08-30 DIAGNOSIS — E10.65 UNCONTROLLED TYPE 1 DIABETES MELLITUS WITH HYPERGLYCEMIA (HCC): ICD-10-CM

## 2023-08-30 RX ORDER — BLOOD SUGAR DIAGNOSTIC
1 STRIP MISCELLANEOUS 3 TIMES DAILY
Qty: 153 EACH | Refills: 1 | Status: SHIPPED | OUTPATIENT
Start: 2023-08-30

## 2023-08-30 NOTE — TELEPHONE ENCOUNTER
----- Message from Shani Mauro sent at 8/30/2023 10:50 AM EDT -----  Subject: Refill Request    QUESTIONS  Name of Medication? Continuous Blood Gluc  (FREESTYLE ALLAN 2   READER) CARISSA  Patient-reported dosage and instructions? 1 Device by Does not apply route   daily  How many days do you have left? 0  Preferred Pharmacy? [facility] [id number]  Pharmacy phone number (if available)? [phone]  ---------------------------------------------------------------------------  --------------  [employer]  What is the best way for the office to contact you? OK to leave message on   voicemail  Preferred Call Back Phone Number? [phone]  ---------------------------------------------------------------------------  --------------  SCRIPT ANSWERS  Relationship to Patient?  Self

## 2023-08-30 NOTE — TELEPHONE ENCOUNTER
[address] called and advised that they do not have the \"cool\" test strips that were ordered by this office. Pharmacy tech wasnted to know if they could use brand approved by patient insurance. Writer advised them this would be acceptable.

## 2023-08-30 NOTE — TELEPHONE ENCOUNTER
Refill Request       Last Seen: Last Seen Department: Visit date not found  Last Seen by PCP: 4/21/2023    Last Written: 08/01/23    Next Appointment:   Future Appointments   Date Time Provider [address] [place] [address]   9/7/2023  2:30 PM Dank Desir MD [facility]         Future appointment scheduled    Glucose Blood (COOL BLOOD GLUCOSE TEST STRIPS VI) [[id number]]     Order Details  Dose, Route, Frequency: As Directed   Dispense Quantity: -- Refills: --          Sig:    Blood Glucose Test strips

## 2023-10-22 DIAGNOSIS — E10.65 UNCONTROLLED TYPE 1 DIABETES MELLITUS WITH HYPERGLYCEMIA (HCC): ICD-10-CM

## 2023-10-22 NOTE — TELEPHONE ENCOUNTER
Refill Request     CONFIRM preferred pharmacy with the patient. If Mail Order Rx - Pend for 90 day refill. Last Seen: Last Seen Department: 4/21/2023  Last Seen by PCP: 4/21/2023    Last Written: 8/1/2023    If no future appointment scheduled:  Review the last OV with PCP and review information for follow-up visit,  Route STAFF MESSAGE with patient name to the [facility] for scheduling with the following information:            -  Timing of next visit           -  Visit type ie Physical, OV, etc           -  Diagnoses/Reason ie. COPD, HTN - Do not use MEDICATION, Follow-up or CHECK UP - Give reason for visit      Next Appointment:   No future appointments. Message sent to [employer] to schedule appt with patient? YES-   Return in about 3 months (around 7/21/2023) for DMI check.       Requested Prescriptions     Pending Prescriptions Disp Refills    Continuous Blood Gluc Sensor (FREESTYLE ALLAN 2 SENSOR) MISC [Pharmacy Med Name: FREESTYLE ALLAN 2 SENSOR] 1 each 1     Sig: USE AS DIRECTED TO CHECK BLOOD SUGAR AND CHANGE OUT ONCE EVERY 14 DAYS

## 2024-04-04 ENCOUNTER — TELEPHONE (OUTPATIENT)
Dept: ENDOCRINOLOGY | Age: 29
End: 2024-04-04

## 2025-08-10 ENCOUNTER — INPATIENT HOSPITAL (OUTPATIENT)
Dept: URBAN - METROPOLITAN AREA HOSPITAL 104 | Facility: HOSPITAL | Age: 30
End: 2025-08-10
Payer: COMMERCIAL

## 2025-08-10 DIAGNOSIS — D50.0 IRON DEFICIENCY ANEMIA SECONDARY TO BLOOD LOSS (CHRONIC): ICD-10-CM

## 2025-08-10 DIAGNOSIS — K92.1 MELENA: ICD-10-CM

## 2025-08-10 PROCEDURE — 99254 IP/OBS CNSLTJ NEW/EST MOD 60: CPT | Performed by: HEALTH EDUCATOR

## 2025-08-11 ENCOUNTER — INPATIENT HOSPITAL (OUTPATIENT)
Dept: URBAN - METROPOLITAN AREA HOSPITAL 104 | Facility: HOSPITAL | Age: 30
End: 2025-08-11
Payer: COMMERCIAL

## 2025-08-11 DIAGNOSIS — K92.1 MELENA: ICD-10-CM

## 2025-08-11 DIAGNOSIS — K29.50 UNSPECIFIED CHRONIC GASTRITIS WITHOUT BLEEDING: ICD-10-CM

## 2025-08-11 DIAGNOSIS — K22.10 ULCER OF ESOPHAGUS WITHOUT BLEEDING: ICD-10-CM

## 2025-08-11 PROCEDURE — 43239 EGD BIOPSY SINGLE/MULTIPLE: CPT | Performed by: INTERNAL MEDICINE
